# Patient Record
Sex: FEMALE | Employment: UNEMPLOYED | URBAN - METROPOLITAN AREA
[De-identification: names, ages, dates, MRNs, and addresses within clinical notes are randomized per-mention and may not be internally consistent; named-entity substitution may affect disease eponyms.]

---

## 2018-01-01 ENCOUNTER — APPOINTMENT (OUTPATIENT)
Dept: GENERAL RADIOLOGY | Age: 83
DRG: 871 | End: 2018-01-01
Attending: EMERGENCY MEDICINE
Payer: COMMERCIAL

## 2018-01-01 ENCOUNTER — APPOINTMENT (OUTPATIENT)
Dept: CT IMAGING | Age: 83
DRG: 871 | End: 2018-01-01
Attending: PHYSICIAN ASSISTANT
Payer: COMMERCIAL

## 2018-01-01 ENCOUNTER — HOSPITAL ENCOUNTER (INPATIENT)
Age: 83
LOS: 4 days | Discharge: NURSING, REHAB OR LONG-TERM CARE | DRG: 871 | End: 2018-06-08
Attending: EMERGENCY MEDICINE | Admitting: INTERNAL MEDICINE
Payer: COMMERCIAL

## 2018-01-01 VITALS
OXYGEN SATURATION: 100 % | TEMPERATURE: 97.2 F | HEART RATE: 73 BPM | WEIGHT: 94.3 LBS | RESPIRATION RATE: 18 BRPM | SYSTOLIC BLOOD PRESSURE: 81 MMHG | DIASTOLIC BLOOD PRESSURE: 51 MMHG | BODY MASS INDEX: 17.35 KG/M2 | HEIGHT: 62 IN

## 2018-01-01 DIAGNOSIS — Z51.5 HOSPICE CARE: ICD-10-CM

## 2018-01-01 DIAGNOSIS — N28.9 RENAL INSUFFICIENCY: ICD-10-CM

## 2018-01-01 DIAGNOSIS — E86.1 HYPOVOLEMIA: Primary | ICD-10-CM

## 2018-01-01 DIAGNOSIS — I48.91 ATRIAL FIBRILLATION WITH RVR (HCC): ICD-10-CM

## 2018-01-01 LAB
ALBUMIN SERPL-MCNC: 2.5 G/DL (ref 3.4–5)
ALBUMIN SERPL-MCNC: 3.5 G/DL (ref 3.4–5)
ALBUMIN/GLOB SERPL: 0.6 {RATIO} (ref 0.8–1.7)
ALBUMIN/GLOB SERPL: 0.7 {RATIO} (ref 0.8–1.7)
ALP SERPL-CCNC: 77 U/L (ref 45–117)
ALP SERPL-CCNC: 93 U/L (ref 45–117)
ALT SERPL-CCNC: 16 U/L (ref 13–56)
ALT SERPL-CCNC: 54 U/L (ref 13–56)
ANION GAP SERPL CALC-SCNC: 14 MMOL/L (ref 3–18)
ANION GAP SERPL CALC-SCNC: 15 MMOL/L (ref 3–18)
ANION GAP SERPL CALC-SCNC: 16 MMOL/L (ref 3–18)
ANION GAP SERPL CALC-SCNC: 18 MMOL/L (ref 3–18)
ANION GAP SERPL CALC-SCNC: 22 MMOL/L (ref 3–18)
APPEARANCE UR: ABNORMAL
APTT PPP: 30.2 SEC (ref 23–36.4)
ARTERIAL PATENCY WRIST A: YES
AST SERPL-CCNC: 30 U/L (ref 15–37)
AST SERPL-CCNC: 84 U/L (ref 15–37)
ATRIAL RATE: 141 BPM
ATRIAL RATE: 150 BPM
BACTERIA SPEC CULT: ABNORMAL
BACTERIA SPEC CULT: NORMAL
BACTERIA URNS QL MICRO: ABNORMAL /HPF
BASE DEFICIT BLD-SCNC: 8 MMOL/L
BASOPHILS # BLD: 0 K/UL (ref 0–0.06)
BASOPHILS NFR BLD: 0 % (ref 0–3)
BDY SITE: ABNORMAL
BILIRUB SERPL-MCNC: 0.5 MG/DL (ref 0.2–1)
BILIRUB SERPL-MCNC: 1.6 MG/DL (ref 0.2–1)
BILIRUB UR QL: NEGATIVE
BUN SERPL-MCNC: 82 MG/DL (ref 7–18)
BUN SERPL-MCNC: 83 MG/DL (ref 7–18)
BUN SERPL-MCNC: 87 MG/DL (ref 7–18)
BUN SERPL-MCNC: 90 MG/DL (ref 7–18)
BUN SERPL-MCNC: 93 MG/DL (ref 7–18)
BUN/CREAT SERPL: 33 (ref 12–20)
BUN/CREAT SERPL: 44 (ref 12–20)
BUN/CREAT SERPL: 46 (ref 12–20)
BUN/CREAT SERPL: 46 (ref 12–20)
BUN/CREAT SERPL: 48 (ref 12–20)
CALCIUM SERPL-MCNC: 11.2 MG/DL (ref 8.5–10.1)
CALCIUM SERPL-MCNC: 8.6 MG/DL (ref 8.5–10.1)
CALCIUM SERPL-MCNC: 8.8 MG/DL (ref 8.5–10.1)
CALCIUM SERPL-MCNC: 9 MG/DL (ref 8.5–10.1)
CALCIUM SERPL-MCNC: 9.2 MG/DL (ref 8.5–10.1)
CALCULATED R AXIS, ECG10: 27 DEGREES
CALCULATED R AXIS, ECG10: 7 DEGREES
CALCULATED T AXIS, ECG11: -78 DEGREES
CALCULATED T AXIS, ECG11: -95 DEGREES
CHLORIDE SERPL-SCNC: 108 MMOL/L (ref 100–108)
CHLORIDE SERPL-SCNC: 113 MMOL/L (ref 100–108)
CHLORIDE SERPL-SCNC: 114 MMOL/L (ref 100–108)
CHLORIDE SERPL-SCNC: 115 MMOL/L (ref 100–108)
CHLORIDE SERPL-SCNC: 116 MMOL/L (ref 100–108)
CK MB CFR SERPL CALC: 3.4 % (ref 0–4)
CK MB CFR SERPL CALC: 3.7 % (ref 0–4)
CK MB SERPL-MCNC: 25.3 NG/ML (ref 5–25)
CK MB SERPL-MCNC: 27.4 NG/ML (ref 5–25)
CK SERPL-CCNC: 692 U/L (ref 26–192)
CK SERPL-CCNC: 702 U/L (ref 26–192)
CK SERPL-CCNC: 809 U/L (ref 26–192)
CO2 SERPL-SCNC: 12 MMOL/L (ref 21–32)
CO2 SERPL-SCNC: 14 MMOL/L (ref 21–32)
CO2 SERPL-SCNC: 16 MMOL/L (ref 21–32)
CO2 SERPL-SCNC: 18 MMOL/L (ref 21–32)
CO2 SERPL-SCNC: 18 MMOL/L (ref 21–32)
COLOR UR: ABNORMAL
CREAT SERPL-MCNC: 1.81 MG/DL (ref 0.6–1.3)
CREAT SERPL-MCNC: 1.9 MG/DL (ref 0.6–1.3)
CREAT SERPL-MCNC: 1.94 MG/DL (ref 0.6–1.3)
CREAT SERPL-MCNC: 2.02 MG/DL (ref 0.6–1.3)
CREAT SERPL-MCNC: 2.47 MG/DL (ref 0.6–1.3)
DIAGNOSIS, 93000: NORMAL
DIAGNOSIS, 93000: NORMAL
DIFFERENTIAL METHOD BLD: ABNORMAL
EOSINOPHIL # BLD: 0 K/UL (ref 0–0.4)
EOSINOPHIL NFR BLD: 0 % (ref 0–5)
ERYTHROCYTE [DISTWIDTH] IN BLOOD BY AUTOMATED COUNT: 15.4 % (ref 11.6–14.5)
ERYTHROCYTE [DISTWIDTH] IN BLOOD BY AUTOMATED COUNT: 15.4 % (ref 11.6–14.5)
ERYTHROCYTE [DISTWIDTH] IN BLOOD BY AUTOMATED COUNT: 15.9 % (ref 11.6–14.5)
GAS FLOW.O2 O2 DELIVERY SYS: ABNORMAL L/MIN
GAS FLOW.O2 SETTING OXYMISER: 3 L/M
GLOBULIN SER CALC-MCNC: 4 G/DL (ref 2–4)
GLOBULIN SER CALC-MCNC: 5 G/DL (ref 2–4)
GLUCOSE BLD STRIP.AUTO-MCNC: 104 MG/DL (ref 70–110)
GLUCOSE BLD STRIP.AUTO-MCNC: 109 MG/DL (ref 70–110)
GLUCOSE BLD STRIP.AUTO-MCNC: 109 MG/DL (ref 70–110)
GLUCOSE BLD STRIP.AUTO-MCNC: 111 MG/DL (ref 70–110)
GLUCOSE BLD STRIP.AUTO-MCNC: 68 MG/DL (ref 70–110)
GLUCOSE BLD STRIP.AUTO-MCNC: 68 MG/DL (ref 70–110)
GLUCOSE BLD STRIP.AUTO-MCNC: 77 MG/DL (ref 70–110)
GLUCOSE BLD STRIP.AUTO-MCNC: 80 MG/DL (ref 70–110)
GLUCOSE BLD STRIP.AUTO-MCNC: 82 MG/DL (ref 70–110)
GLUCOSE BLD STRIP.AUTO-MCNC: 88 MG/DL (ref 70–110)
GLUCOSE SERPL-MCNC: 102 MG/DL (ref 74–99)
GLUCOSE SERPL-MCNC: 105 MG/DL (ref 74–99)
GLUCOSE SERPL-MCNC: 112 MG/DL (ref 74–99)
GLUCOSE SERPL-MCNC: 138 MG/DL (ref 74–99)
GLUCOSE SERPL-MCNC: 142 MG/DL (ref 74–99)
GLUCOSE UR STRIP.AUTO-MCNC: NEGATIVE MG/DL
HCO3 BLD-SCNC: 14.8 MMOL/L (ref 22–26)
HCT VFR BLD AUTO: 37 % (ref 35–45)
HCT VFR BLD AUTO: 37.1 % (ref 35–45)
HCT VFR BLD AUTO: 40.4 % (ref 35–45)
HGB BLD-MCNC: 12.2 G/DL (ref 12–16)
HGB BLD-MCNC: 12.5 G/DL (ref 12–16)
HGB BLD-MCNC: 13.2 G/DL (ref 12–16)
HGB UR QL STRIP: ABNORMAL
INR PPP: 1.3 (ref 0.8–1.2)
KETONES UR QL STRIP.AUTO: 15 MG/DL
LACTATE BLD-SCNC: 10.9 MMOL/L (ref 0.4–2)
LACTATE BLD-SCNC: 2.1 MMOL/L (ref 0.4–2)
LACTATE BLD-SCNC: 2.6 MMOL/L (ref 0.4–2)
LACTATE BLD-SCNC: 2.7 MMOL/L (ref 0.4–2)
LACTATE SERPL-SCNC: 1.8 MMOL/L (ref 0.4–2)
LEUKOCYTE ESTERASE UR QL STRIP.AUTO: ABNORMAL
LYMPHOCYTES # BLD: 0.3 K/UL (ref 0.8–3.5)
LYMPHOCYTES # BLD: 0.8 K/UL (ref 0.8–3.5)
LYMPHOCYTES # BLD: 2.3 K/UL (ref 0.8–3.5)
LYMPHOCYTES NFR BLD: 2 % (ref 20–51)
LYMPHOCYTES NFR BLD: 26 % (ref 20–51)
LYMPHOCYTES NFR BLD: 5 % (ref 20–51)
MAGNESIUM SERPL-MCNC: 2.1 MG/DL (ref 1.6–2.6)
MCH RBC QN AUTO: 29.2 PG (ref 24–34)
MCH RBC QN AUTO: 29.5 PG (ref 24–34)
MCH RBC QN AUTO: 29.8 PG (ref 24–34)
MCHC RBC AUTO-ENTMCNC: 32.7 G/DL (ref 31–37)
MCHC RBC AUTO-ENTMCNC: 32.9 G/DL (ref 31–37)
MCHC RBC AUTO-ENTMCNC: 33.8 G/DL (ref 31–37)
MCV RBC AUTO: 88.3 FL (ref 74–97)
MCV RBC AUTO: 88.8 FL (ref 74–97)
MCV RBC AUTO: 90.2 FL (ref 74–97)
METAMYELOCYTES NFR BLD MANUAL: 1 %
MONOCYTES # BLD: 0.2 K/UL (ref 0–1)
MONOCYTES # BLD: 0.7 K/UL (ref 0–1)
MONOCYTES # BLD: 0.7 K/UL (ref 0–1)
MONOCYTES NFR BLD: 2 % (ref 2–9)
MONOCYTES NFR BLD: 4 % (ref 2–9)
MONOCYTES NFR BLD: 4 % (ref 2–9)
NEUTS BAND NFR BLD MANUAL: 15 % (ref 0–5)
NEUTS BAND NFR BLD MANUAL: 28 % (ref 0–5)
NEUTS SEG # BLD: 15.2 K/UL (ref 1.8–8)
NEUTS SEG # BLD: 15.4 K/UL (ref 1.8–8)
NEUTS SEG # BLD: 6.3 K/UL (ref 1.8–8)
NEUTS SEG NFR BLD: 65 % (ref 42–75)
NEUTS SEG NFR BLD: 72 % (ref 42–75)
NEUTS SEG NFR BLD: 76 % (ref 42–75)
NITRITE UR QL STRIP.AUTO: NEGATIVE
PCO2 BLD: 24.2 MMHG (ref 35–45)
PH BLD: 7.4 [PH] (ref 7.35–7.45)
PH UR STRIP: 5.5 [PH] (ref 5–8)
PHOSPHATE SERPL-MCNC: 5.2 MG/DL (ref 2.5–4.9)
PLATELET # BLD AUTO: 212 K/UL (ref 135–420)
PLATELET # BLD AUTO: 226 K/UL (ref 135–420)
PLATELET # BLD AUTO: 255 K/UL (ref 135–420)
PLATELET COMMENTS,PCOM: ABNORMAL
PMV BLD AUTO: 10.6 FL (ref 9.2–11.8)
PMV BLD AUTO: 10.8 FL (ref 9.2–11.8)
PMV BLD AUTO: 11 FL (ref 9.2–11.8)
PO2 BLD: 150 MMHG (ref 80–100)
POTASSIUM SERPL-SCNC: 4.2 MMOL/L (ref 3.5–5.5)
POTASSIUM SERPL-SCNC: 4.2 MMOL/L (ref 3.5–5.5)
POTASSIUM SERPL-SCNC: 4.3 MMOL/L (ref 3.5–5.5)
POTASSIUM SERPL-SCNC: 4.5 MMOL/L (ref 3.5–5.5)
POTASSIUM SERPL-SCNC: 4.8 MMOL/L (ref 3.5–5.5)
PROT SERPL-MCNC: 6.5 G/DL (ref 6.4–8.2)
PROT SERPL-MCNC: 8.5 G/DL (ref 6.4–8.2)
PROT UR STRIP-MCNC: 100 MG/DL
PROTHROMBIN TIME: 16 SEC (ref 11.5–15.2)
Q-T INTERVAL, ECG07: 300 MS
Q-T INTERVAL, ECG07: 356 MS
QRS DURATION, ECG06: 70 MS
QRS DURATION, ECG06: 76 MS
QTC CALCULATION (BEZET), ECG08: 474 MS
QTC CALCULATION (BEZET), ECG08: 545 MS
RBC # BLD AUTO: 4.18 M/UL (ref 4.2–5.3)
RBC # BLD AUTO: 4.19 M/UL (ref 4.2–5.3)
RBC # BLD AUTO: 4.48 M/UL (ref 4.2–5.3)
RBC #/AREA URNS HPF: POSITIVE /HPF (ref 0–5)
RBC MORPH BLD: ABNORMAL
SAO2 % BLD: 99 % (ref 92–97)
SERVICE CMNT-IMP: ABNORMAL
SERVICE CMNT-IMP: ABNORMAL
SERVICE CMNT-IMP: NORMAL
SODIUM SERPL-SCNC: 142 MMOL/L (ref 136–145)
SODIUM SERPL-SCNC: 145 MMOL/L (ref 136–145)
SODIUM SERPL-SCNC: 147 MMOL/L (ref 136–145)
SODIUM SERPL-SCNC: 147 MMOL/L (ref 136–145)
SODIUM SERPL-SCNC: 148 MMOL/L (ref 136–145)
SP GR UR REFRACTOMETRY: 1.01 (ref 1–1.03)
SPECIMEN TYPE: ABNORMAL
TOTAL RESP. RATE, ITRR: 25
TROPONIN I SERPL-MCNC: 0.07 NG/ML (ref 0–0.04)
TROPONIN I SERPL-MCNC: 0.21 NG/ML (ref 0–0.04)
TROPONIN I SERPL-MCNC: 0.52 NG/ML (ref 0–0.04)
TROPONIN I SERPL-MCNC: 0.52 NG/ML (ref 0–0.04)
TSH SERPL DL<=0.05 MIU/L-ACNC: 0.53 UIU/ML (ref 0.36–3.74)
UROBILINOGEN UR QL STRIP.AUTO: 1 EU/DL (ref 0.2–1)
VENTRICULAR RATE, ECG03: 141 BPM
VENTRICULAR RATE, ECG03: 150 BPM
WBC # BLD AUTO: 16.3 K/UL (ref 4.6–13.2)
WBC # BLD AUTO: 16.9 K/UL (ref 4.6–13.2)
WBC # BLD AUTO: 8.8 K/UL (ref 4.6–13.2)
WBC URNS QL MICRO: ABNORMAL /HPF (ref 0–4)

## 2018-01-01 PROCEDURE — 65660000000 HC RM CCU STEPDOWN

## 2018-01-01 PROCEDURE — 92610 EVALUATE SWALLOWING FUNCTION: CPT

## 2018-01-01 PROCEDURE — 93005 ELECTROCARDIOGRAM TRACING: CPT

## 2018-01-01 PROCEDURE — 81001 URINALYSIS AUTO W/SCOPE: CPT | Performed by: EMERGENCY MEDICINE

## 2018-01-01 PROCEDURE — 84100 ASSAY OF PHOSPHORUS: CPT | Performed by: PHYSICIAN ASSISTANT

## 2018-01-01 PROCEDURE — 83605 ASSAY OF LACTIC ACID: CPT

## 2018-01-01 PROCEDURE — 83605 ASSAY OF LACTIC ACID: CPT | Performed by: PHYSICIAN ASSISTANT

## 2018-01-01 PROCEDURE — 92526 ORAL FUNCTION THERAPY: CPT

## 2018-01-01 PROCEDURE — 74011250636 HC RX REV CODE- 250/636: Performed by: HOSPITALIST

## 2018-01-01 PROCEDURE — 85730 THROMBOPLASTIN TIME PARTIAL: CPT | Performed by: PHYSICIAN ASSISTANT

## 2018-01-01 PROCEDURE — 96375 TX/PRO/DX INJ NEW DRUG ADDON: CPT

## 2018-01-01 PROCEDURE — 74011000250 HC RX REV CODE- 250: Performed by: HOSPITALIST

## 2018-01-01 PROCEDURE — 82962 GLUCOSE BLOOD TEST: CPT

## 2018-01-01 PROCEDURE — 85025 COMPLETE CBC W/AUTO DIFF WBC: CPT | Performed by: PHYSICIAN ASSISTANT

## 2018-01-01 PROCEDURE — 74011000250 HC RX REV CODE- 250: Performed by: PHYSICIAN ASSISTANT

## 2018-01-01 PROCEDURE — 82550 ASSAY OF CK (CPK): CPT | Performed by: PHYSICIAN ASSISTANT

## 2018-01-01 PROCEDURE — 74011000250 HC RX REV CODE- 250: Performed by: INTERNAL MEDICINE

## 2018-01-01 PROCEDURE — 83735 ASSAY OF MAGNESIUM: CPT | Performed by: PHYSICIAN ASSISTANT

## 2018-01-01 PROCEDURE — 74011000258 HC RX REV CODE- 258: Performed by: PHYSICIAN ASSISTANT

## 2018-01-01 PROCEDURE — 96361 HYDRATE IV INFUSION ADD-ON: CPT

## 2018-01-01 PROCEDURE — 80053 COMPREHEN METABOLIC PANEL: CPT | Performed by: HOSPITALIST

## 2018-01-01 PROCEDURE — 74011250636 HC RX REV CODE- 250/636: Performed by: EMERGENCY MEDICINE

## 2018-01-01 PROCEDURE — 74011250636 HC RX REV CODE- 250/636: Performed by: INTERNAL MEDICINE

## 2018-01-01 PROCEDURE — 74011250636 HC RX REV CODE- 250/636

## 2018-01-01 PROCEDURE — 82803 BLOOD GASES ANY COMBINATION: CPT

## 2018-01-01 PROCEDURE — 87186 SC STD MICRODIL/AGAR DIL: CPT | Performed by: PHYSICIAN ASSISTANT

## 2018-01-01 PROCEDURE — 80048 BASIC METABOLIC PNL TOTAL CA: CPT | Performed by: INTERNAL MEDICINE

## 2018-01-01 PROCEDURE — 36415 COLL VENOUS BLD VENIPUNCTURE: CPT | Performed by: HOSPITALIST

## 2018-01-01 PROCEDURE — 77010033678 HC OXYGEN DAILY

## 2018-01-01 PROCEDURE — 36600 WITHDRAWAL OF ARTERIAL BLOOD: CPT

## 2018-01-01 PROCEDURE — 94760 N-INVAS EAR/PLS OXIMETRY 1: CPT

## 2018-01-01 PROCEDURE — 82550 ASSAY OF CK (CPK): CPT | Performed by: HOSPITALIST

## 2018-01-01 PROCEDURE — 87040 BLOOD CULTURE FOR BACTERIA: CPT | Performed by: EMERGENCY MEDICINE

## 2018-01-01 PROCEDURE — 36415 COLL VENOUS BLD VENIPUNCTURE: CPT | Performed by: INTERNAL MEDICINE

## 2018-01-01 PROCEDURE — 74011000258 HC RX REV CODE- 258: Performed by: INTERNAL MEDICINE

## 2018-01-01 PROCEDURE — 77030005563 HC CATH URETH INT MMGH -A

## 2018-01-01 PROCEDURE — 96365 THER/PROPH/DIAG IV INF INIT: CPT

## 2018-01-01 PROCEDURE — 74011000258 HC RX REV CODE- 258: Performed by: EMERGENCY MEDICINE

## 2018-01-01 PROCEDURE — 87077 CULTURE AEROBIC IDENTIFY: CPT | Performed by: PHYSICIAN ASSISTANT

## 2018-01-01 PROCEDURE — 87086 URINE CULTURE/COLONY COUNT: CPT | Performed by: PHYSICIAN ASSISTANT

## 2018-01-01 PROCEDURE — 74011250636 HC RX REV CODE- 250/636: Performed by: PHYSICIAN ASSISTANT

## 2018-01-01 PROCEDURE — 80048 BASIC METABOLIC PNL TOTAL CA: CPT | Performed by: PHYSICIAN ASSISTANT

## 2018-01-01 PROCEDURE — 80053 COMPREHEN METABOLIC PANEL: CPT | Performed by: EMERGENCY MEDICINE

## 2018-01-01 PROCEDURE — 77030038269 HC DRN EXT URIN PURWCK BARD -A

## 2018-01-01 PROCEDURE — 74011000250 HC RX REV CODE- 250: Performed by: EMERGENCY MEDICINE

## 2018-01-01 PROCEDURE — 70450 CT HEAD/BRAIN W/O DYE: CPT

## 2018-01-01 PROCEDURE — 71045 X-RAY EXAM CHEST 1 VIEW: CPT

## 2018-01-01 PROCEDURE — 74011000258 HC RX REV CODE- 258: Performed by: HOSPITALIST

## 2018-01-01 PROCEDURE — 84443 ASSAY THYROID STIM HORMONE: CPT | Performed by: PHYSICIAN ASSISTANT

## 2018-01-01 PROCEDURE — 84484 ASSAY OF TROPONIN QUANT: CPT | Performed by: EMERGENCY MEDICINE

## 2018-01-01 PROCEDURE — 51701 INSERT BLADDER CATHETER: CPT

## 2018-01-01 PROCEDURE — 99285 EMERGENCY DEPT VISIT HI MDM: CPT

## 2018-01-01 PROCEDURE — 87493 C DIFF AMPLIFIED PROBE: CPT | Performed by: HOSPITALIST

## 2018-01-01 PROCEDURE — 85610 PROTHROMBIN TIME: CPT | Performed by: PHYSICIAN ASSISTANT

## 2018-01-01 PROCEDURE — 85025 COMPLETE CBC W/AUTO DIFF WBC: CPT | Performed by: EMERGENCY MEDICINE

## 2018-01-01 PROCEDURE — 74011250637 HC RX REV CODE- 250/637: Performed by: HOSPITALIST

## 2018-01-01 PROCEDURE — 77030037878 HC DRSG MEPILEX >48IN BORD MOLN -B

## 2018-01-01 PROCEDURE — 77030027138 HC INCENT SPIROMETER -A

## 2018-01-01 PROCEDURE — 65610000006 HC RM INTENSIVE CARE

## 2018-01-01 PROCEDURE — 85025 COMPLETE CBC W/AUTO DIFF WBC: CPT | Performed by: HOSPITALIST

## 2018-01-01 RX ORDER — DEXTROSE MONOHYDRATE AND SODIUM CHLORIDE 5; .9 G/100ML; G/100ML
75 INJECTION, SOLUTION INTRAVENOUS CONTINUOUS
Status: DISCONTINUED | OUTPATIENT
Start: 2018-01-01 | End: 2018-01-01

## 2018-01-01 RX ORDER — LORAZEPAM 2 MG/ML
0.5 CONCENTRATE ORAL
Qty: 5 ML | Refills: 0 | Status: SHIPPED | OUTPATIENT
Start: 2018-01-01

## 2018-01-01 RX ORDER — LEVOFLOXACIN 250 MG/1
250 TABLET ORAL EVERY 24 HOURS
Qty: 4 TAB | Refills: 0 | Status: SHIPPED | OUTPATIENT
Start: 2018-01-01 | End: 2018-01-01

## 2018-01-01 RX ORDER — METOPROLOL TARTRATE 5 MG/5ML
2.5 INJECTION INTRAVENOUS EVERY 6 HOURS
Status: DISCONTINUED | OUTPATIENT
Start: 2018-01-01 | End: 2018-01-01

## 2018-01-01 RX ORDER — HEPARIN SODIUM 5000 [USP'U]/ML
5000 INJECTION, SOLUTION INTRAVENOUS; SUBCUTANEOUS EVERY 8 HOURS
Status: DISCONTINUED | OUTPATIENT
Start: 2018-01-01 | End: 2018-01-01

## 2018-01-01 RX ORDER — MAGNESIUM SULFATE 100 %
4 CRYSTALS MISCELLANEOUS AS NEEDED
Status: DISCONTINUED | OUTPATIENT
Start: 2018-01-01 | End: 2018-01-01 | Stop reason: HOSPADM

## 2018-01-01 RX ORDER — POLYETHYLENE GLYCOL 3350 17 G/17G
17 POWDER, FOR SOLUTION ORAL
COMMUNITY
End: 2018-01-01

## 2018-01-01 RX ORDER — SODIUM CHLORIDE 9 MG/ML
50 INJECTION, SOLUTION INTRAVENOUS CONTINUOUS
Status: DISCONTINUED | OUTPATIENT
Start: 2018-01-01 | End: 2018-01-01

## 2018-01-01 RX ORDER — METOPROLOL TARTRATE 25 MG/1
12.5 TABLET, FILM COATED ORAL EVERY 12 HOURS
Status: DISCONTINUED | OUTPATIENT
Start: 2018-01-01 | End: 2018-01-01

## 2018-01-01 RX ORDER — FAMOTIDINE 20 MG/1
20 TABLET, FILM COATED ORAL DAILY
Status: DISCONTINUED | OUTPATIENT
Start: 2018-01-01 | End: 2018-01-01 | Stop reason: HOSPADM

## 2018-01-01 RX ORDER — POLYVINYL ALCOHOL 14 MG/ML
2 SOLUTION/ DROPS OPHTHALMIC AS NEEDED
Qty: 30 ML | Refills: 0 | Status: SHIPPED | OUTPATIENT
Start: 2018-01-01

## 2018-01-01 RX ORDER — MORPHINE SULFATE 100 MG/5ML
2 SOLUTION ORAL
Status: DISCONTINUED | OUTPATIENT
Start: 2018-01-01 | End: 2018-01-01 | Stop reason: HOSPADM

## 2018-01-01 RX ORDER — LANOLIN ALCOHOL/MO/W.PET/CERES
1000 CREAM (GRAM) TOPICAL DAILY
COMMUNITY
End: 2018-01-01

## 2018-01-01 RX ORDER — ASPIRIN 81 MG/1
81 TABLET ORAL DAILY
Status: ON HOLD | COMMUNITY
End: 2018-01-01

## 2018-01-01 RX ORDER — ACETAMINOPHEN 500 MG
1000 TABLET ORAL
COMMUNITY
End: 2018-01-01

## 2018-01-01 RX ORDER — GUAIFENESIN/DEXTROMETHORPHAN 100-10MG/5
5 SYRUP ORAL
COMMUNITY
End: 2018-01-01

## 2018-01-01 RX ORDER — MORPHINE SULFATE 100 MG/5ML
2 SOLUTION ORAL
Qty: 5 ML | Refills: 0 | Status: SHIPPED | OUTPATIENT
Start: 2018-01-01

## 2018-01-01 RX ORDER — OMEPRAZOLE 10 MG/1
10 CAPSULE, DELAYED RELEASE ORAL DAILY
COMMUNITY
End: 2018-01-01

## 2018-01-01 RX ORDER — LORAZEPAM 2 MG/ML
0.5 INJECTION INTRAMUSCULAR
Status: DISCONTINUED | OUTPATIENT
Start: 2018-01-01 | End: 2018-01-01

## 2018-01-01 RX ORDER — LORAZEPAM 2 MG/ML
0.5 CONCENTRATE ORAL
Status: DISCONTINUED | OUTPATIENT
Start: 2018-01-01 | End: 2018-01-01 | Stop reason: HOSPADM

## 2018-01-01 RX ORDER — BUSPIRONE HYDROCHLORIDE 10 MG/1
10 TABLET ORAL 2 TIMES DAILY
COMMUNITY
End: 2018-01-01

## 2018-01-01 RX ORDER — MEGESTROL ACETATE 40 MG/ML
400 SUSPENSION ORAL DAILY
COMMUNITY
End: 2018-01-01

## 2018-01-01 RX ORDER — ASPIRIN 81 MG/1
81 TABLET ORAL DAILY
Qty: 30 TAB | Refills: 0 | Status: SHIPPED | OUTPATIENT
Start: 2018-01-01

## 2018-01-01 RX ORDER — LISINOPRIL 5 MG/1
5 TABLET ORAL DAILY
COMMUNITY
End: 2018-01-01

## 2018-01-01 RX ORDER — METOPROLOL TARTRATE 5 MG/5ML
2.5 INJECTION INTRAVENOUS ONCE
Status: COMPLETED | OUTPATIENT
Start: 2018-01-01 | End: 2018-01-01

## 2018-01-01 RX ORDER — DEXTROSE MONOHYDRATE AND SODIUM CHLORIDE 5; .45 G/100ML; G/100ML
100 INJECTION, SOLUTION INTRAVENOUS CONTINUOUS
Status: DISCONTINUED | OUTPATIENT
Start: 2018-01-01 | End: 2018-01-01

## 2018-01-01 RX ORDER — LORATADINE 10 MG/1
10 TABLET ORAL DAILY
COMMUNITY
End: 2018-01-01

## 2018-01-01 RX ORDER — DEXTROSE 50 % IN WATER (D50W) INTRAVENOUS SYRINGE
25-50 AS NEEDED
Status: DISCONTINUED | OUTPATIENT
Start: 2018-01-01 | End: 2018-01-01 | Stop reason: HOSPADM

## 2018-01-01 RX ORDER — POLYVINYL ALCOHOL 14 MG/ML
2 SOLUTION/ DROPS OPHTHALMIC AS NEEDED
Status: ON HOLD | COMMUNITY
End: 2018-01-01

## 2018-01-01 RX ORDER — ANASTROZOLE 1 MG/1
1 TABLET ORAL DAILY
COMMUNITY
End: 2018-01-01

## 2018-01-01 RX ORDER — CHOLECALCIFEROL (VITAMIN D3) 125 MCG
1 CAPSULE ORAL DAILY
COMMUNITY
End: 2018-01-01

## 2018-01-01 RX ORDER — HEPARIN SODIUM 5000 [USP'U]/ML
INJECTION, SOLUTION INTRAVENOUS; SUBCUTANEOUS
Status: COMPLETED
Start: 2018-01-01 | End: 2018-01-01

## 2018-01-01 RX ORDER — UREA 10 %
2 LOTION (ML) TOPICAL 2 TIMES DAILY
Status: DISCONTINUED | OUTPATIENT
Start: 2018-01-01 | End: 2018-01-01 | Stop reason: HOSPADM

## 2018-01-01 RX ORDER — DILTIAZEM HYDROCHLORIDE 5 MG/ML
5 INJECTION INTRAVENOUS
Status: COMPLETED | OUTPATIENT
Start: 2018-01-01 | End: 2018-01-01

## 2018-01-01 RX ORDER — ONDANSETRON 2 MG/ML
4 INJECTION INTRAMUSCULAR; INTRAVENOUS
Status: COMPLETED | OUTPATIENT
Start: 2018-01-01 | End: 2018-01-01

## 2018-01-01 RX ORDER — LEVOFLOXACIN 250 MG/1
250 TABLET ORAL EVERY 24 HOURS
Status: DISCONTINUED | OUTPATIENT
Start: 2018-01-01 | End: 2018-01-01 | Stop reason: HOSPADM

## 2018-01-01 RX ORDER — SODIUM CHLORIDE 450 MG/100ML
75 INJECTION, SOLUTION INTRAVENOUS CONTINUOUS
Status: DISCONTINUED | OUTPATIENT
Start: 2018-01-01 | End: 2018-01-01

## 2018-01-01 RX ORDER — ACETAMINOPHEN 500 MG
500 TABLET ORAL
Status: DISCONTINUED | OUTPATIENT
Start: 2018-01-01 | End: 2018-01-01 | Stop reason: HOSPADM

## 2018-01-01 RX ORDER — DOCUSATE SODIUM 100 MG/1
100 CAPSULE, LIQUID FILLED ORAL
COMMUNITY
End: 2018-01-01

## 2018-01-01 RX ORDER — FLUOCINONIDE TOPICAL SOLUTION USP, 0.05% 0.5 MG/ML
1 SOLUTION TOPICAL
COMMUNITY
End: 2018-01-01

## 2018-01-01 RX ORDER — METOPROLOL TARTRATE 5 MG/5ML
5 INJECTION INTRAVENOUS EVERY 6 HOURS
Status: DISCONTINUED | OUTPATIENT
Start: 2018-01-01 | End: 2018-01-01

## 2018-01-01 RX ADMIN — HEPARIN SODIUM 5000 UNITS: 5000 INJECTION, SOLUTION INTRAVENOUS; SUBCUTANEOUS at 04:25

## 2018-01-01 RX ADMIN — SODIUM CHLORIDE 20 MG: 9 INJECTION INTRAMUSCULAR; INTRAVENOUS; SUBCUTANEOUS at 22:19

## 2018-01-01 RX ADMIN — DEXTROSE MONOHYDRATE AND SODIUM CHLORIDE 75 ML/HR: 5; .45 INJECTION, SOLUTION INTRAVENOUS at 06:35

## 2018-01-01 RX ADMIN — WATER 2 G: 1 INJECTION INTRAMUSCULAR; INTRAVENOUS; SUBCUTANEOUS at 00:27

## 2018-01-01 RX ADMIN — FAMOTIDINE 20 MG: 10 INJECTION, SOLUTION INTRAVENOUS at 13:18

## 2018-01-01 RX ADMIN — DEXTROSE MONOHYDRATE AND SODIUM CHLORIDE 75 ML/HR: 5; .45 INJECTION, SOLUTION INTRAVENOUS at 07:39

## 2018-01-01 RX ADMIN — DEXTROSE MONOHYDRATE 25 G: 25 INJECTION, SOLUTION INTRAVENOUS at 05:49

## 2018-01-01 RX ADMIN — METOPROLOL TARTRATE 2.5 MG: 5 INJECTION, SOLUTION INTRAVENOUS at 05:19

## 2018-01-01 RX ADMIN — DEXTROSE MONOHYDRATE 12.5 G: 25 INJECTION, SOLUTION INTRAVENOUS at 00:15

## 2018-01-01 RX ADMIN — SODIUM CHLORIDE 10 MG/HR: 900 INJECTION, SOLUTION INTRAVENOUS at 04:25

## 2018-01-01 RX ADMIN — LEVOFLOXACIN 250 MG: 250 TABLET, FILM COATED ORAL at 17:06

## 2018-01-01 RX ADMIN — DEXTROSE MONOHYDRATE AND SODIUM CHLORIDE 100 ML/HR: 5; .45 INJECTION, SOLUTION INTRAVENOUS at 19:53

## 2018-01-01 RX ADMIN — SODIUM CHLORIDE 1000 ML: 900 INJECTION, SOLUTION INTRAVENOUS at 12:58

## 2018-01-01 RX ADMIN — HEPARIN SODIUM 5000 UNITS: 5000 INJECTION, SOLUTION INTRAVENOUS; SUBCUTANEOUS at 20:30

## 2018-01-01 RX ADMIN — SODIUM CHLORIDE 20 MG: 9 INJECTION INTRAMUSCULAR; INTRAVENOUS; SUBCUTANEOUS at 08:58

## 2018-01-01 RX ADMIN — HEPARIN SODIUM 5000 UNITS: 5000 INJECTION, SOLUTION INTRAVENOUS; SUBCUTANEOUS at 22:19

## 2018-01-01 RX ADMIN — METOPROLOL TARTRATE 2.5 MG: 5 INJECTION, SOLUTION INTRAVENOUS at 00:48

## 2018-01-01 RX ADMIN — SODIUM CHLORIDE 50 ML/HR: 900 INJECTION, SOLUTION INTRAVENOUS at 03:57

## 2018-01-01 RX ADMIN — METOPROLOL TARTRATE 2.5 MG: 5 INJECTION, SOLUTION INTRAVENOUS at 00:04

## 2018-01-01 RX ADMIN — DILTIAZEM HYDROCHLORIDE 5 MG: 5 INJECTION INTRAVENOUS at 16:58

## 2018-01-01 RX ADMIN — WATER 2 G: 1 INJECTION INTRAMUSCULAR; INTRAVENOUS; SUBCUTANEOUS at 00:38

## 2018-01-01 RX ADMIN — HEPARIN SODIUM 5000 UNITS: 5000 INJECTION, SOLUTION INTRAVENOUS; SUBCUTANEOUS at 13:18

## 2018-01-01 RX ADMIN — SODIUM CHLORIDE 5 MG/HR: 900 INJECTION, SOLUTION INTRAVENOUS at 17:59

## 2018-01-01 RX ADMIN — WATER 2 G: 1 INJECTION INTRAMUSCULAR; INTRAVENOUS; SUBCUTANEOUS at 00:04

## 2018-01-01 RX ADMIN — METOPROLOL TARTRATE 5 MG: 5 INJECTION, SOLUTION INTRAVENOUS at 13:18

## 2018-01-01 RX ADMIN — LORAZEPAM 0.5 MG: 2 INJECTION INTRAMUSCULAR; INTRAVENOUS at 00:53

## 2018-01-01 RX ADMIN — HEPARIN SODIUM 5000 UNITS: 5000 INJECTION, SOLUTION INTRAVENOUS; SUBCUTANEOUS at 06:15

## 2018-01-01 RX ADMIN — SODIUM CHLORIDE 75 ML/HR: 450 INJECTION, SOLUTION INTRAVENOUS at 09:30

## 2018-01-01 RX ADMIN — LORAZEPAM 0.5 MG: 2 INJECTION INTRAMUSCULAR; INTRAVENOUS at 16:59

## 2018-01-01 RX ADMIN — HEPARIN SODIUM 5000 UNITS: 5000 INJECTION, SOLUTION INTRAVENOUS; SUBCUTANEOUS at 14:36

## 2018-01-01 RX ADMIN — METOPROLOL TARTRATE 2.5 MG: 5 INJECTION, SOLUTION INTRAVENOUS at 06:15

## 2018-01-01 RX ADMIN — METOPROLOL TARTRATE 2.5 MG: 5 INJECTION, SOLUTION INTRAVENOUS at 22:37

## 2018-01-01 RX ADMIN — HEPARIN SODIUM 5000 UNITS: 5000 INJECTION, SOLUTION INTRAVENOUS; SUBCUTANEOUS at 05:19

## 2018-01-01 RX ADMIN — MORPHINE SULFATE 2 MG: 100 SOLUTION ORAL at 02:28

## 2018-01-01 RX ADMIN — METOPROLOL TARTRATE 2.5 MG: 5 INJECTION, SOLUTION INTRAVENOUS at 17:48

## 2018-01-01 RX ADMIN — FAMOTIDINE 20 MG: 20 TABLET ORAL at 17:06

## 2018-01-01 RX ADMIN — ONDANSETRON 4 MG: 2 INJECTION INTRAMUSCULAR; INTRAVENOUS at 16:55

## 2018-06-04 PROBLEM — G93.41 ACUTE METABOLIC ENCEPHALOPATHY: Status: ACTIVE | Noted: 2018-01-01

## 2018-06-04 PROBLEM — I48.91 ATRIAL FIBRILLATION WITH RVR (HCC): Status: ACTIVE | Noted: 2018-01-01

## 2018-06-04 NOTE — ED NOTES
Patient requesting the NRB be taken off since it's making her sick to her stomach. Patient placed on 02NC and tolerating well. She continues to try and remove the 02 from her nose.

## 2018-06-04 NOTE — Clinical Note
Status[de-identified] Inpatient [101] Type of Bed: Intensive Care [6] Inpatient Hospitalization Certified Necessary for the Following Reasons: 3. Patient receiving treatment that can only be provided in an inpatient setting (further clarification in H&P documentation) Admitting Diagnosis: Atrial [facility] with [facility] ([facility] [[id number]] Admitting Physician: Anjel Buchanan [[id number]] Attending Physician: Anjel Buchanan [[id number]] Estimated Length of Stay: 2 Midnights Discharge Plan[de-identified] Extended Care Facility (e.g. Adult Home, Nursing Home, etc.)

## 2018-06-04 NOTE — ED PROVIDER NOTES
EMERGENCY DEPARTMENT HISTORY AND PHYSICAL EXAM    12:47 PM      Date: 6/4/2018  Patient Name: Leatha Lr    History of Presenting Illness     Chief Complaint   Patient presents with    Altered mental status    Hypotension         History Provided By: EMS    Chief Complaint: Altered Mental Status   Duration: Earlier today  Timing:  Constant and Worsening  Location: N/A  Quality: N/A  Severity: Moderate  Modifying Factors: None   Associated Symptoms: Unable to assess      Additional History (Context): Leatha Lr is a 80 y.o. female with hx of a-fib and CKD presenting to the ED via EMS from [facility] with c/o constant, worsening altered mental status. Pt was at her PCP office when they found her slumpt over in her chair, appeared dry and non verbal. At baseline pt is active and converses. HPI limited due to pt's mental status change and is non verbal. Pt has a non-invasive DNR on file. No other sx or complaints given at this time. PCP: None        Past History     Past Medical History:  No past medical history on file. Past Surgical History:  No past surgical history on file. Family History:  No family history on file. Social History:  Social History   Substance Use Topics    Smoking status: Not on file    Smokeless tobacco: Not on file    Alcohol use Not on file       Allergies:  No Known Allergies      Review of Systems       Review of Systems   Unable to perform ROS: Mental status change         Physical Exam     Visit Vitals    /67    Pulse (!) 141    Temp 97.8 °F (36.6 °C)    Resp 25    Wt 51.3 kg (113 lb)    SpO2 99%         Physical Exam   Constitutional: She appears well-developed and well-nourished. HENT:   Head: Normocephalic and atraumatic. Mouth/Throat: Oropharynx is clear and moist. Mucous membranes are dry. Eyes: Conjunctivae are normal.   Neck: Normal range of motion. Neck supple. No JVD present.    Cardiovascular: Normal heart sounds and intact distal pulses. An irregularly irregular rhythm present. Tachycardia present. No murmur heard. Faintly palpable peripheral pulses   Pulmonary/Chest: Effort normal and breath sounds normal.   Abdominal: Soft. Bowel sounds are normal. She exhibits no distension. There is no tenderness. Musculoskeletal: Normal range of motion. She exhibits no deformity. Lymphadenopathy:     She has no cervical adenopathy. Neurological: Coordination normal.   Confused, opens eyes, however makes incomprehensible sounds   Skin: Skin is warm and dry. No rash noted. Psychiatric: She has a normal mood and affect. Nursing note and vitals reviewed. Diagnostic Study Results     Labs -  Recent Results (from the past 12 hour(s))   CBC WITH AUTOMATED DIFF    Collection Time: 06/04/18 12:45 PM   Result Value Ref Range    WBC 8.8 4.6 - 13.2 K/uL    RBC 4.48 4.20 - 5.30 M/uL    HGB 13.2 12.0 - 16.0 g/dL    HCT 40.4 35.0 - 45.0 %    MCV 90.2 74.0 - 97.0 FL    MCH 29.5 24.0 - 34.0 PG    MCHC 32.7 31.0 - 37.0 g/dL    RDW 15.4 (H) 11.6 - 14.5 %    PLATELET 314 573 - 680 K/uL    MPV 11.0 9.2 - 11.8 FL    NEUTROPHILS 72 42 - 75 %    LYMPHOCYTES 26 20 - 51 %    MONOCYTES 2 2 - 9 %    EOSINOPHILS 0 0 - 5 %    BASOPHILS 0 0 - 3 %    ABS. NEUTROPHILS 6.3 1.8 - 8.0 K/UL    ABS. LYMPHOCYTES 2.3 0.8 - 3.5 K/UL    ABS. MONOCYTES 0.2 0 - 1.0 K/UL    ABS. EOSINOPHILS 0.0 0.0 - 0.4 K/UL    ABS.  BASOPHILS 0.0 0.0 - 0.06 K/UL    DF MANUAL      PLATELET COMMENTS ADEQUATE PLATELETS      RBC COMMENTS ANISOCYTOSIS  1+        RBC COMMENTS OVALOCYTES  FEW       METABOLIC PANEL, COMPREHENSIVE    Collection Time: 06/04/18 12:45 PM   Result Value Ref Range    Sodium 142 136 - 145 mmol/L    Potassium 4.8 3.5 - 5.5 mmol/L    Chloride 108 100 - 108 mmol/L    CO2 12 (L) 21 - 32 mmol/L    Anion gap 22 (H) 3.0 - 18 mmol/L    Glucose 102 (H) 74 - 99 mg/dL    BUN 82 (H) 7.0 - 18 MG/DL    Creatinine 2.47 (H) 0.6 - 1.3 MG/DL    BUN/Creatinine ratio 33 (H) 12 - 20 GFR est AA 22 (L) >60 ml/min/1.73m2    GFR est non-AA 18 (L) >60 ml/min/1.73m2    Calcium 11.2 (H) 8.5 - 10.1 MG/DL    Bilirubin, total 1.6 (H) 0.2 - 1.0 MG/DL    ALT (SGPT) 16 13 - 56 U/L    AST (SGOT) 30 15 - 37 U/L    Alk. phosphatase 93 45 - 117 U/L    Protein, total 8.5 (H) 6.4 - 8.2 g/dL    Albumin 3.5 3.4 - 5.0 g/dL    Globulin 5.0 (H) 2.0 - 4.0 g/dL    A-G Ratio 0.7 (L) 0.8 - 1.7     TROPONIN I    Collection Time: 06/04/18 12:45 PM   Result Value Ref Range    Troponin-I, Qt. 0.07 (H) 0.0 - 0.045 NG/ML   POC LACTIC ACID    Collection Time: 06/04/18 12:50 PM   Result Value Ref Range    Lactic Acid (POC) 10.9 (HH) 0.4 - 2.0 mmol/L   POC LACTIC ACID    Collection Time: 06/04/18  7:27 PM   Result Value Ref Range    Lactic Acid (POC) 2.7 (HH) 0.4 - 2.0 mmol/L   URINALYSIS W/ RFLX MICROSCOPIC    Collection Time: 06/04/18  9:40 PM   Result Value Ref Range    Color DARK YELLOW      Appearance TURBID      Specific gravity 1.014 1.005 - 1.030      pH (UA) 5.5 5.0 - 8.0      Protein 100 (A) NEG mg/dL    Glucose NEGATIVE  NEG mg/dL    Ketone 15 (A) NEG mg/dL    Bilirubin NEGATIVE  NEG      Blood LARGE (A) NEG      Urobilinogen 1.0 0.2 - 1.0 EU/dL    Nitrites NEGATIVE  NEG      Leukocyte Esterase LARGE (A) NEG     URINE MICROSCOPIC ONLY    Collection Time: 06/04/18  9:40 PM   Result Value Ref Range    WBC TOO NUMEROUS TO COUNT 0 - 4 /hpf    RBC POSITIVE 0 - 5 /hpf    Bacteria 4+ (A) NEG /hpf   POC LACTIC ACID    Collection Time: 06/04/18 10:11 PM   Result Value Ref Range    Lactic Acid (POC) 2.6 (HH) 0.4 - 2.0 mmol/L       Radiologic Studies -   XR CHEST PORT   IMPRESSION:  No evidence of CHF. Minimal streaky fibroses or subtle atelectasis in right lung base. Subtle infiltrates are not excluded. But there is no evidence of confluent pneumonia or pleural effusion. Medical Decision Making   I am the first provider for this patient.     I reviewed the vital signs, available nursing notes, past medical history, past surgical history, family history and social history. Mary Carmona is a 80 y.o. female with hx of a-fib and CKD presenting to the ED via EMS from [facility] with c/o constant, worsening altered mental status. Pt was at her PCP office when they found her slumpt over in her chair, appeared dry and non verbal. At baseline pt is active and converses. HPI limited due to pt's mental status change and is non verbal. Pt has a non-invasive DNR on file. Patient in rapid afib, hypotensive. MOLST indicates limited interventions, so will start with IVF. Differential Diagnosis: rapid afib, hypovolemic, will evaluate for primary cardiac etiology, infection, metabolic derangement    Testing: cxr, cbc, cmp, troponin, ua  Treatments: IVF bolus x2L      Vital Signs-Reviewed the patient's vital signs. EKG: Interpreted by the EP. Time Interpreted: 8879   Rate: 150   Rhythm: Rapid a-fibrillation with PVCs. Interpretation: QTc duration of 474 ms. No STEMI. Records Reviewed: Nursing Notes and Old Medical Records (Time of Review: 12:47 PM)    ED Course: Progress Notes, Reevaluation, and Consults:    2:20 PM: Re-evaluated pt. Pt is feeling better. She is awake, alert and speaking with me, asked for the lights to be turned off. BP better after 2L IVF bolus, but rate remains elevated. Will plan for cardizem drip for rate control, now that BP stable with fluids. Lactate elevation and troponin likely [id number] to severe dehydration and rapid afib. No infectious signs. 7:35 PM Urine not sent (due to acuity of other patients in critical zone), but will be sent. No antibiotics given as patient without fever or leukocytosis. Will hold off pending UA (patient denies symptoms). Repeat lactic acid 2.7, down from 10.9 after fluids. Rate better controlled, but still tachycardic (rapid afib w/RVR). Consult: I discussed care with Dr. Feng Patiño (Hospitalist).   It was a standard discussion including patient history, chief complaint, available diagnostic results, and predicted treatment course. Accepts admission. Will go to ICU due to titratable drip. ICU notified. Critical Care Time: The services I provided to this patient were to treat and/or prevent clinically significant deterioration that could result in the failure of one or more body systems and/or organ systems due to a-fib with RvR and hypotension. Services included the following:  -reviewing nursing notes and old charts  -vital sign assessments  -direct patient care  -medication orders and management  -interpreting and reviewing diagnostic studies/labs  -re-evaluations  -documentation time    Aggregate critical care time was 45 minutes, which includes only time during which I was engaged in work directly related to the patient's care as described above, whether I was at bedside or elsewhere in the Emergency Department. It did not include time spent performing other reported procedures or the services of residents, students, nurses, or advance practice providers. Dagmar Landry MD    7:09 PM      For Hospitalized Patients:    1. Hospitalization Decision Time:  The decision to hospitalize the patient was made by Dr. Cristina Valente at [address] [place] [address] on 6/4/2018     Diagnosis     Clinical Impression:   1. Hypovolemia    2. Atrial fibrillation with RVR ([address]    3. Renal insufficiency        Disposition: Admit     Patient's Medications    No medications on file     _______________________________    Attestations:  Scribe Attestation     Willy Gonzalez acting as a scribe for and in the presence of Dagmar Landry MD      June 04, 2018 at 12:47 PM       Provider Attestation:      I personally performed the services described in the documentation, reviewed the documentation, as recorded by the scribe in my presence, and it accurately and completely records my words and actions.  June 04, 2018 at 12:47 PM - Dagmar Landry MD    _______________________________

## 2018-06-05 NOTE — PROGRESS NOTES
Bedside and Verbal shift change report given to Jean Bumpers, RN (oncoming nurse) by Corina Marquez RN   (offgoing nurse). Report included the following information SBAR, Kardex, Intake/Output, MAR and Recent Results.

## 2018-06-05 NOTE — PROGRESS NOTES
[facility]  [facility]    Patient: Irena Jordan Age: 80 y.o. : 1921 MR#: [id number] SSN: [id number]  Date/Time: 2018     Subjective: pt AAOx1, remains confused. Son at bedside   C/o mild abd pain, no CP or SOB. Pt failed SLP eval but Per son she been coughing with drinking for long time, pt doesn't want any PEG or TF, wants DNR. Assessment/Plan:   1. Acute metabolic encephalopathy: due to # 2, will monitor   2. UTI: cont Abx, follow up Cx  3. VALENTE: cont IVF and monitor   4. Metabolic acidosis, acute, anion gap due to # 2/3: will monitor   5. Diarrhea: will get C diff toxin   6. Paroxysmal atrial fibrillation with RVR: rate better in low 100's, off Cardizem drip, will cont IV BB with holding parameters   7. Elevated trop: demand ischemia vs related to # 3 and # 5, no CP, son doesn't want aggressive measures but ok for cardio eval and med Rx. He is not comfortable with anticoagulation for now. Will trend CE  8. Severe Sepsis d/t # 2 - VALENTE and encephalopathy with clear infectious source, cont Abx. 9. Dysphagia: will re-eval with SLP in am and MBS. 10. Lactic acidosis: improved   11. Advanced age  15. DNR  D/w pt's son Libertad Mullins [phone] at bedside in detail, no aggressive measures. No PEG or TF. Repeat BP 96/48 and  to 110's. RN to validate       I spent 30 minutes with the patient in face-to-face consultation, of which greater than 50% was spent in counseling and coordination of care as described above.     Case discussed with:  [x]Patient  [x]Family  [x]Nursing  []Case Management  DVT Prophylaxis:  []Lovenox  [x]Hep SQ  []SCDs  []Coumadin   []On Heparin gtt    Objective:   VS:   Visit Vitals    BP (!) 83/60    Pulse (!) 131    Temp 97.5 °F (36.4 °C)    Resp 28    Wt 51.3 kg (113 lb)    SpO2 91%      Tmax/24hrs: Temp (24hrs), Av.2 °F (36.2 °C), Min:97 °F (36.1 °C), Max:97.5 °F (36.4 °C)  IOBRIEF  Intake/Output Summary (Last 24 hours) at 06/05/18 1608  Last data filed at 06/05/18 0600   Gross per 24 hour   Intake              123 ml   Output                0 ml   Net              123 ml         General:  Alert, Mor[place], no acute distress    Pulmonary:  Bilaterally clear, no Rales. Cardiovascular: Regular rate and Rhythm. GI:  Soft, Non distended, Non tender. + Bowel sounds. Extremities:  No edema, cyanosis, clubbing. Neurologic: Alert and oriented X 1.  Moves all ext  Additional:    Medications:   Current Facility-Administered Medications   Medication Dose Route Frequency    acetaminophen (TYLENOL) tablet 500 mg  500 mg Oral Q4H PRN    heparin (porcine) injection 5,000 Units  5,000 Units SubCUTAneous Q8H    cefTRIAXone (ROCEPHIN) 2 g in sterile water (preservative free) 20 mL IV syringe  2 g IntraVENous Q24H    glucose chewable tablet 16 g  4 Tab Oral PRN    glucagon (GLUCAGEN) injection 1 mg  1 mg IntraMUSCular PRN    dextrose (D50W) injection syrg 12.5-25 g  25-50 mL IntraVENous PRN    0.45% sodium chloride infusion  75 mL/hr IntraVENous CONTINUOUS    metoprolol (LOPRESSOR) injection 5 mg  5 mg IntraVENous Q6H    Please obtain patient height and add to computer ASAP  1 Each Other ONCE    dilTIAZem (CARDIZEM) 100 mg in 0.9% sodium chloride (MBP/ADV) 100 mL infusion  0-15 mg/hr IntraVENous TITRATE    famotidine (PF) (PEPCID) 20 mg in sodium chloride 0.9% 10 mL injection  20 mg IntraVENous DAILY       Labs:    Recent Results (from the past 24 hour(s))   EKG, 12 LEAD, INITIAL    Collection Time: 06/04/18  4:26 PM   Result Value Ref Range    Ventricular Rate 150 BPM    Atrial Rate 150 BPM    QRS Duration 76 ms    Q-T Interval 300 ms    QTC Calculation (Bezet) 474 ms    Calculated R Axis 7 degrees    Calculated T Axis -95 degrees    Diagnosis       Probable Atrial fibrillation with occasional premature ventricular complexes  Low voltage QRS  Possible Inferior infarct , age undetermined  Cannot rule out Anterior infarct , age undetermined  Abnormal ECG  No previous ECGs available  Confirmed by Maryuri Larios MD, Gretchen Sanchez ([id number]) on 6/5/2018 1:44:20 PM     POC LACTIC ACID    Collection Time: 06/04/18  7:27 PM   Result Value Ref Range    Lactic Acid (POC) 2.7 (HH) 0.4 - 2.0 mmol/L   URINALYSIS W/ RFLX MICROSCOPIC    Collection Time: 06/04/18  9:40 PM   Result Value Ref Range    Color DARK YELLOW      Appearance TURBID      Specific gravity 1.014 1.005 - 1.030      pH (UA) 5.5 5.0 - 8.0      Protein 100 (A) NEG mg/dL    Glucose NEGATIVE  NEG mg/dL    Ketone 15 (A) NEG mg/dL    Bilirubin NEGATIVE  NEG      Blood LARGE (A) NEG      Urobilinogen 1.0 0.2 - 1.0 EU/dL    Nitrites NEGATIVE  NEG      Leukocyte Esterase LARGE (A) NEG     URINE MICROSCOPIC ONLY    Collection Time: 06/04/18  9:40 PM   Result Value Ref Range    WBC TOO NUMEROUS TO COUNT 0 - 4 /hpf    RBC POSITIVE 0 - 5 /hpf    Bacteria 4+ (A) NEG /hpf   CULTURE, BLOOD    Collection Time: 06/04/18  9:50 PM   Result Value Ref Range    Special Requests: NO SPECIAL REQUESTS      Culture result: NO GROWTH AFTER 11 HOURS     POC LACTIC ACID    Collection Time: 06/04/18 10:11 PM   Result Value Ref Range    Lactic Acid (POC) 2.6 (HH) 0.4 - 2.0 mmol/L   POC G3    Collection Time: 06/04/18 11:28 PM   Result Value Ref Range    Device: NASAL CANNULA      Flow rate (POC) 3 L/M    pH (POC) 7.396 7.35 - 7.45      pCO2 (POC) 24.2 (L) 35.0 - 45.0 MMHG    pO2 (POC) 150 (H) 80 - 100 MMHG    HCO3 (POC) 14.8 (L) 22 - 26 MMOL/L    sO2 (POC) 99 (H) 92 - 97 %    Base deficit (POC) 8 mmol/L    Allens test (POC) YES      Total resp.  rate 25      Site RIGHT RADIAL      Specimen type (POC) ARTERIAL      Performed by Eda Bergman    METABOLIC PANEL, BASIC    Collection Time: 06/05/18 12:05 AM   Result Value Ref Range    Sodium 147 (H) 136 - 145 mmol/L    Potassium 4.2 3.5 - 5.5 mmol/L    Chloride 114 (H) 100 - 108 mmol/L    CO2 18 (L) 21 - 32 mmol/L    Anion gap 15 3.0 - 18 mmol/L    Glucose 142 (H) 74 - 99 mg/dL    BUN 83 (H) 7.0 - 18 MG/DL    Creatinine 1.90 (H) 0.6 - 1.3 MG/DL    BUN/Creatinine ratio 44 (H) 12 - 20      GFR est AA 30 (L) >60 ml/min/1.73m2    GFR est non-AA 25 (L) >60 ml/min/1.73m2    Calcium 9.2 8.5 - 10.1 MG/DL   CARDIAC PANEL,(CK, CKMB & TROPONIN)    Collection Time: 06/05/18 12:05 AM   Result Value Ref Range     (H) 26 - 192 U/L    CK - MB 27.4 (H) <3.6 ng/ml    CK-MB Index 3.4 0.0 - 4.0 %    Troponin-I, Qt. 0.52 (H) 0.0 - 0.045 NG/ML   CBC WITH AUTOMATED DIFF    Collection Time: 06/05/18 12:05 AM   Result Value Ref Range    WBC 16.9 (H) 4.6 - 13.2 K/uL    RBC 4.19 (L) 4.20 - 5.30 M/uL    HGB 12.5 12.0 - 16.0 g/dL    HCT 37.0 35.0 - 45.0 %    MCV 88.3 74.0 - 97.0 FL    MCH 29.8 24.0 - 34.0 PG    MCHC 33.8 31.0 - 37.0 g/dL    RDW 15.4 (H) 11.6 - 14.5 %    PLATELET 949 452 - 671 K/uL    MPV 10.8 9.2 - 11.8 FL    NEUTROPHILS 76 (H) 42 - 75 %    BAND NEUTROPHILS 15 (H) 0 - 5 %    LYMPHOCYTES 5 (L) 20 - 51 %    MONOCYTES 4 2 - 9 %    EOSINOPHILS 0 0 - 5 %    BASOPHILS 0 0 - 3 %    ABS. NEUTROPHILS 15.4 (H) 1.8 - 8.0 K/UL    ABS. LYMPHOCYTES 0.8 0.8 - 3.5 K/UL    ABS. MONOCYTES 0.7 0 - 1.0 K/UL    ABS. EOSINOPHILS 0.0 0.0 - 0.4 K/UL    ABS.  BASOPHILS 0.0 0.0 - 0.06 K/UL    DF MANUAL      PLATELET COMMENTS ADEQUATE PLATELETS      RBC COMMENTS ANISOCYTOSIS  1+        RBC COMMENTS OVALOCYTES  1+       MAGNESIUM    Collection Time: 06/05/18 12:05 AM   Result Value Ref Range    Magnesium 2.1 1.6 - 2.6 mg/dL   PHOSPHORUS    Collection Time: 06/05/18 12:05 AM   Result Value Ref Range    Phosphorus 5.2 (H) 2.5 - 4.9 MG/DL   PROTHROMBIN TIME + INR    Collection Time: 06/05/18 12:05 AM   Result Value Ref Range    Prothrombin time 16.0 (H) 11.5 - 15.2 sec    INR 1.3 (H) 0.8 - 1.2     PTT    Collection Time: 06/05/18 12:05 AM   Result Value Ref Range    aPTT 30.2 23.0 - 36.4 SEC   TSH 3RD GENERATION    Collection Time: 06/05/18 12:05 AM   Result Value Ref Range    TSH 0.53 0.36 - 3.74 uIU/mL   POC LACTIC ACID Collection Time: 06/05/18  1:24 AM   Result Value Ref Range    Lactic Acid (POC) 2.1 (HH) 0.4 - 2.0 mmol/L   EKG, 12 LEAD, SUBSEQUENT    Collection Time: 06/05/18  1:46 AM   Result Value Ref Range    Ventricular Rate 141 BPM    Atrial Rate 141 BPM    QRS Duration 70 ms    Q-T Interval 356 ms    QTC Calculation (Bezet) 545 ms    Calculated R Axis 27 degrees    Calculated T Axis -78 degrees    Diagnosis       Atrial fibrillation with rapid ventricular response with premature   ventricular or aberrantly conducted complexes  Low voltage QRS  Nonspecific T wave abnormality , probably digitalis effect  Abnormal ECG  When compared with ECG of 04-JUN-2018 16:26,  No significant change was found  Confirmed by Bismark Nguyen MD, Jean-Pierre Dockery ([id number]) on 6/5/2018 2:01:59 PM     GLUCOSE, POC    Collection Time: 06/05/18  5:27 AM   Result Value Ref Range    Glucose (POC) 104 70 - 110 mg/dL   CARDIAC PANEL,(CK, CKMB & TROPONIN)    Collection Time: 06/05/18  7:03 AM   Result Value Ref Range     (H) 26 - 192 U/L    CK - MB 25.3 (H) <3.6 ng/ml    CK-MB Index 3.7 0.0 - 4.0 %    Troponin-I, Qt. 0.52 (H) 0.0 - 9.204 NG/ML   METABOLIC PANEL, BASIC    Collection Time: 06/05/18  7:03 AM   Result Value Ref Range    Sodium 147 (H) 136 - 145 mmol/L    Potassium 4.5 3.5 - 5.5 mmol/L    Chloride 113 (H) 100 - 108 mmol/L    CO2 18 (L) 21 - 32 mmol/L    Anion gap 16 3.0 - 18 mmol/L    Glucose 138 (H) 74 - 99 mg/dL    BUN 87 (H) 7.0 - 18 MG/DL    Creatinine 1.81 (H) 0.6 - 1.3 MG/DL    BUN/Creatinine ratio 48 (H) 12 - 20      GFR est AA 31 (L) >60 ml/min/1.73m2    GFR est non-AA 26 (L) >60 ml/min/1.73m2    Calcium 9.0 8.5 - 10.1 MG/DL   LACTIC ACID    Collection Time: 06/05/18  7:03 AM   Result Value Ref Range    Lactic acid 1.8 0.4 - 2.0 MMOL/L   GLUCOSE, POC    Collection Time: 06/05/18 11:15 AM   Result Value Ref Range    Glucose (POC) 109 70 - 110 mg/dL       Signed By: Simran Story MD     June 5, 2018

## 2018-06-05 NOTE — PROGRESS NOTES
Attempted dysphagia evaluation; however, pt refusing despite encouragement. She became combative at that time stating, \"Leave me alone. Tell the doctors I told you to go away. \" Will attempt later this date or as medical condition permits.      Thank you for this referral.    Isabela Gomez M.S. CCC-SLP/L  Speech-Language Pathologist

## 2018-06-05 NOTE — H&P
Hospitalist Admission Note    NAME: Irena Jordan   :  1921   MRN:  [id number]     Date/Time of admission:  2018 11:09 PM    Patient PCP: None  ________________________________________________________________________    My assessment of this patient's clinical condition and my plan of care is as follows. Assessment / Plan:  1. Acute metabolic encephalopathy  2. UTI  3. VALENTE  4. Metabolic acidosis, acute, anion gap  5. Paroxysmal atrial fibrillation with rvr  6. Severe Sepsis d/t #2 - VALENTE and encephalopathy with clear infectious source  7. Advanced age    3. Admit to stepdown for IV dilt gtt and 7.5 (nontitrated) and gentle hydration  2. Start IV rocephin in setting of uti   3. Monitor renal function and lytes; should improve as creatinine baseline is 1.1 in 2018 per [facility] after hours nurse  4. Neuro checks  5. Monitor for need to give bicarb gtt, but hold for now   6. Keep son updated  7. DNR with limits    Code Status: DNR with limits  Surrogate Decision Maker: pt's son    DVT Prophylaxis: sc hep  GI Prophylaxis: not indicated          Subjective:   CHIEF COMPLAINT: ams    HISTORY OF PRESENT ILLNESS:     Rachna Dunn is a 80 y.o.  female who presents with confusion and weakness. Pt resides in [facility] and goes to [facility] throughout the week. She is usually very interactive and talkative. Pt was noted to be slumped over in her chair today at [facility] and pt was sent to the ED. She was noted to be hypotensive and in RVR. She was given some fluids and bp responded appropriately. She was then started on dilt gtt and medicine was called. She was afebrile with a normal wbc. We were asked to admit for work up and evaluation of the above problems. Upon further investigation, I was able to reach pt's NH who referred me to [facility] and her [place] physician, Dr. Micaela Torres at [phone] and her other PCP, Dr. Roland Steel at [phone].  The [facility] after hours nurse was able to give me recent labs showing that pts creatinine was 1.1 a couple of months ago and her BUN was 40. She also had a bicarb of 29. Further history was that pt was confused over the weekend and they thought pt may have a UTI, but they were not able to get urine from her. I requested urine for a UA and she showed strong evidence for a UTI. No past medical history on file. - need to gather more from [facility] when possible    No past surgical history on file. - need to gather more from [facility] when possible    Social History   Substance Use Topics    Smoking status: Not on file    Smokeless tobacco: Not on file    Alcohol use Not on file    - need to gather more from [facility] when possible    No family history on file. - need to gather more from [facility] when possible  No Known Allergies - need to gather more from [facility] when possible    Prior to Admission medications    Not on File   - need to gather more from [facility] when possible    REVIEW OF SYSTEMS:     I am not able to complete the review of systems because:    The patient is intubated and sedated   x The patient has altered mental status due to his acute medical problems    The patient has baseline aphasia from prior stroke(s)    The patient has baseline dementia and is not reliable historian    The patient is in acute medical distress and unable to provide information           Total of 12 systems reviewed as follows:       POSITIVE= bolded text  Negative = text not underlined  General:  fever, chills, sweats, generalized weakness, weight loss/gain,      loss of appetite   Eyes:    blurred vision, eye pain, loss of vision, double vision  ENT:    rhinorrhea, pharyngitis   Respiratory:   cough, sputum production, SOB, DIXON, wheezing, pleuritic pain   Cardiology:   chest pain, palpitations, orthopnea, PND, edema, syncope   Gastrointestinal:  abdominal pain , N/V, diarrhea, dysphagia, constipation, bleeding   Genitourinary:  frequency, urgency, dysuria, hematuria, incontinence   Muskuloskeletal : arthralgia, myalgia, back pain  Hematology:  easy bruising, nose or gum bleeding, lymphadenopathy   Dermatological: rash, ulceration, pruritis, color change / jaundice  Endocrine:   hot flashes or polydipsia   Neurological:  headache, dizziness, confusion, focal weakness, paresthesia,     Speech difficulties, memory loss, gait difficulty  Psychological: Feelings of anxiety, depression, agitation    Objective:   VITALS:    Visit Vitals    /67    Pulse (!) 141    Temp 97.8 °F (36.6 °C)    Resp 25    Wt 51.3 kg (113 lb)    SpO2 99%       PHYSICAL EXAM:    General:    Laying with eyes closed, but responds to say \"i can hear you\" and \"speak into my left ear. \"   HEENT: Atraumatic, anicteric sclerae, pink conjunctivae     No oral ulcers, mucosa moist, throat clear  Neck:  Supple, symmetrical,  thyroid: non tender  Lungs:   Clear to auscultation bilaterally. No Wheezing or Rhonchi. No rales. Chest wall:  No tenderness  No Accessory muscle use. Heart:   irreg/irreg,  Trace holosystolic murmur   No edema  Abdomen:   Soft, non-tender. Not distended. Bowel sounds normal  Extremities: No cyanosis. No clubbing,      Skin turgor normal, Capillary refill normal, Radial dial pulse 2+  Skin:     Not pale. Not Jaundiced  No rashes   Psych:  Pleasantly disoriented  Neurologic: EOMs intact. No facial asymmetry. No aphasia or slurred speech. Symmetrical strength, Sensation grossly intact.  Alert and oriented X 1.     _______________________________________________________________________  Care Plan discussed with:    Comments   Patient x    Family  x    RN x    Care Manager                    Consultant:      _______________________________________________________________________  Expected  Disposition:   Home with Family    HH/PT/OT/RN    SNF/LTC x   IVY    ________________________________________________________________________  TOTAL TIME:  72 Minutes    Critical Care Provided     Minutes non procedure based Comments    x Reviewed previous records   >50% of visit spent in counseling and coordination of care x Discussion with patient and/or family and questions answered       ________________________________________________________________________      Procedures: see electronic medical records for all procedures/Xrays and details which were not copied into this note but were reviewed prior to creation of Plan. LAB DATA REVIEWED:    Recent Results (from the past 24 hour(s))   CBC WITH AUTOMATED DIFF    Collection Time: 06/04/18 12:45 PM   Result Value Ref Range    WBC 8.8 4.6 - 13.2 K/uL    RBC 4.48 4.20 - 5.30 M/uL    HGB 13.2 12.0 - 16.0 g/dL    HCT 40.4 35.0 - 45.0 %    MCV 90.2 74.0 - 97.0 FL    MCH 29.5 24.0 - 34.0 PG    MCHC 32.7 31.0 - 37.0 g/dL    RDW 15.4 (H) 11.6 - 14.5 %    PLATELET 987 179 - 336 K/uL    MPV 11.0 9.2 - 11.8 FL    NEUTROPHILS 72 42 - 75 %    LYMPHOCYTES 26 20 - 51 %    MONOCYTES 2 2 - 9 %    EOSINOPHILS 0 0 - 5 %    BASOPHILS 0 0 - 3 %    ABS. NEUTROPHILS 6.3 1.8 - 8.0 K/UL    ABS. LYMPHOCYTES 2.3 0.8 - 3.5 K/UL    ABS. MONOCYTES 0.2 0 - 1.0 K/UL    ABS. EOSINOPHILS 0.0 0.0 - 0.4 K/UL    ABS. BASOPHILS 0.0 0.0 - 0.06 K/UL    DF MANUAL      PLATELET COMMENTS ADEQUATE PLATELETS      RBC COMMENTS ANISOCYTOSIS  1+        RBC COMMENTS OVALOCYTES  FEW       METABOLIC PANEL, COMPREHENSIVE    Collection Time: 06/04/18 12:45 PM   Result Value Ref Range    Sodium 142 136 - 145 mmol/L    Potassium 4.8 3.5 - 5.5 mmol/L    Chloride 108 100 - 108 mmol/L    CO2 12 (L) 21 - 32 mmol/L    Anion gap 22 (H) 3.0 - 18 mmol/L    Glucose 102 (H) 74 - 99 mg/dL    BUN 82 (H) 7.0 - 18 MG/DL    Creatinine 2.47 (H) 0.6 - 1.3 MG/DL    BUN/Creatinine ratio 33 (H) 12 - 20      GFR est AA 22 (L) >60 ml/min/1.73m2    GFR est non-AA 18 (L) >60 ml/min/1.73m2    Calcium 11.2 (H) 8.5 - 10.1 MG/DL    Bilirubin, total 1.6 (H) 0.2 - 1.0 MG/DL    ALT (SGPT) 16 13 - 56 U/L    AST (SGOT) 30 15 - 37 U/L    Alk.  phosphatase 93 45 - 117 U/L    Protein, total 8.5 (H) 6.4 - 8.2 g/dL    Albumin 3.5 3.4 - 5.0 g/dL    Globulin 5.0 (H) 2.0 - 4.0 g/dL    A-G Ratio 0.7 (L) 0.8 - 1.7     TROPONIN I    Collection Time: 06/04/18 12:45 PM   Result Value Ref Range    Troponin-I, Qt. 0.07 (H) 0.0 - 0.045 NG/ML   POC LACTIC ACID    Collection Time: 06/04/18 12:50 PM   Result Value Ref Range    Lactic Acid (POC) 10.9 (HH) 0.4 - 2.0 mmol/L   POC LACTIC ACID    Collection Time: 06/04/18  7:27 PM   Result Value Ref Range    Lactic Acid (POC) 2.7 (HH) 0.4 - 2.0 mmol/L   URINALYSIS W/ RFLX MICROSCOPIC    Collection Time: 06/04/18  9:40 PM   Result Value Ref Range    Color DARK YELLOW      Appearance TURBID      Specific gravity 1.014 1.005 - 1.030      pH (UA) 5.5 5.0 - 8.0      Protein 100 (A) NEG mg/dL    Glucose NEGATIVE  NEG mg/dL    Ketone 15 (A) NEG mg/dL    Bilirubin NEGATIVE  NEG      Blood LARGE (A) NEG      Urobilinogen 1.0 0.2 - 1.0 EU/dL    Nitrites NEGATIVE  NEG      Leukocyte Esterase LARGE (A) NEG     URINE MICROSCOPIC ONLY    Collection Time: 06/04/18  9:40 PM   Result Value Ref Range    WBC TOO NUMEROUS TO COUNT 0 - 4 /hpf    RBC POSITIVE 0 - 5 /hpf    Bacteria 4+ (A) NEG /hpf   POC LACTIC ACID    Collection Time: 06/04/18 10:11 PM   Result Value Ref Range    Lactic Acid (POC) 2.6 (HH) 0.4 - 2.0 mmol/L       Zoie Ortiz MD  Internal Medicine  Hospitalist Division

## 2018-06-05 NOTE — PROGRESS NOTES
Problem: Dysphagia (Adult)  Goal: *Acute Goals and Plan of Care (Insert Text)  Patient will:  1. Tolerate PO trials with 0 s/s overt distress in 4/5 trials  2. Utilize compensatory swallow strategies/maneuvers (decrease bite/sip, size/rate, alt. liq/sol) with min cues in 4/5 trials  3. Perform oral-motor/laryngeal exercises to increase oropharyngeal swallow function with min cues  4. Complete an objective swallow study (i.e., MBSS) to assess swallow integrity, r/o aspiration, and determine of safest LRD, min A    Recommend:   NPO with alternate means of nutrition/hydration vs comfort feeds  Strict aspiration precautions (HOB >30 degrees at all times, Oral care TID)    Speech LAnguage Pathology bedside swallow evaluation/treatment     Patient: Denys Jameson (80 y.o. female)  Date: 6/5/2018  Primary Diagnosis: Atrial fibrillation with RVR (HCC)  Atrial fibrillation with RVR (HCC)  Acute metabolic encephalopathy        Precautions: aspiration       ASSESSMENT :  Based on the objective data described below, the patient presents with severe pharyngeal dysphagia c/b overt strong cough, increase in RR, and altered vocal quality post all PO trials. Suspect pt is at high risk for dehydration, malnutrition, and aspiration with PO. Pt's son reports coughing with all PO for the last few months. Laryngeal elevation was delayed/weak to palpation. Rec NPO with consideration of an alternate means of nutrition/hydration, aspiration precautions, and oral care TID. D/w RN and MD. Luke Parks will continue to follow. Patient will benefit from skilled intervention to address the above impairments.   Patients rehabilitation potential is considered to be Guarded  Factors which may influence rehabilitation potential include:   [x]            Mental ability/status  [x]            Medical condition     PLAN :  Recommendations and Planned Interventions: See above  Frequency/Duration: Patient will be followed by speech-language pathology 1-2 times per day/4-7 days per week to address goals. Discharge Recommendations: [facility] and To Be Determined     SUBJECTIVE:   Patient stated I'm not doing anymore. OBJECTIVE:   History reviewed. No pertinent past medical history. History reviewed. No pertinent surgical history. Prior Level of Function/Home Situation: unknown  Diet prior to admission: reg with thin per son  Current Diet:  NPO   Cognitive and Communication Status:  Neurologic State: Alert, Confused, Irritable  Orientation Level: Oriented to person  Cognition: Impulsive, Impaired decision making, Decreased command following  Oral Assessment:  Oral Assessment  Labial: No impairment  Dentition: Intact  Oral Hygiene: adequate  Lingual: No impairment  Velum: No impairment  Mandible: No impairment  P.O. Trials:  Patient Position: [facility] at [facility]  Vocal quality prior to P.O.: No impairment  Consistency Presented: Nectar thick liquid;Honey thick liquid; Thin liquid;Puree  How Presented: Self-fed/presented;Cup/sip;Spoon  Bolus Acceptance: No impairment  Bolus Formation/Control: No impairment  Propulsion: No impairment  Oral Residue: None  Initiation of Swallow: No impairment  Laryngeal Elevation: Weak;Decreased  Aspiration Signs/Symptoms: Change vocal quality; Increase in RR;Delayed cough/throat clear;Strong cough; Infiltrate on chest xray  Pharyngeal Phase Characteristics: Suspected pharyngeal residue;Poor endurance;Multiple swallows; Altered vocal quality; Easily fatigued   Effective Modifications: None  Cues for Modifications: Moderate-maximal     Oral Phase Severity: No impairment  Pharyngeal Phase Severity : Severe     Treatment Post Evaluation:  Treatment provided post evaluation including education of oropharyngeal anatomy/physiology, bedside swallow results, risk of aspiration with PO, and recs of NPO with an alternate means of nutrition/hydration vs comfort feeds. Pt's son verbalized understanding stating, \"I don't think we want the tube. \" ST will continue to follow. GCODESwallowing:  Swallow Current Status CN= 100%   Swallow Goal Status CM= 80-99%    The severity rating is based on the following outcomes:    Clinical Judgment    Pain:  Pt c/o 0/10 pain prior to evaluation. Pt c/o 0/10 pain post evaluation. After treatment:   []            Patient left in no apparent distress sitting up in chair  [x]            Patient left in no apparent distress in bed  [x]            Call bell left within reach  [x]            Nursing notified  []            Caregiver present  []            Bed alarm activated    COMMUNICATION/EDUCATION:   [x]            SLP educated pt and pt's son on aspiration risk and diet recs. Son verbalized understanding. [x]            Patient/family have participated as able in goal setting and plan of care.     Thank you for this referral.    Dorothy Hardy M.S. CCC-SLP/L  Speech-Language Pathologist

## 2018-06-05 NOTE — PROGRESS NOTES
This writer spoke with Theresa LOPEZ care coordinator for this patient. She states that patient is a long term care resident at [facility] [place]. She has [facility] for the day time. She still has a bed at [facility] [place] and can be transitioned to skilled care at [facility] [place] if needed at d/c. Keyona Gonzales wants to be notiifed at the time of d/c [phone].

## 2018-06-05 NOTE — PROGRESS NOTES
Dysphagia eval completed with recs of NPO with consideration of an alternate means of nutrition/hydration vs comfort feeds. Full report to follow.      Thank you for this referral.    Tremaine Sorenson M.S. CCC-SLP/L  Speech-Language Pathologist

## 2018-06-05 NOTE — CONSULTS
[employer] [facility]  Pulmonary, Critical Care, and Sleep Medicine      Name: Lesa Wan MRN: [id number]   : 1921 Hospital: [facility]   Date: 2018          Critical Care Initial Patient Consult    Requesting MD:  Dr. Nereyda Golden                                                Reason for CC Consult: atrial fib with RVR    IMPRESSION:   · Atrial fibrillation with RVR  · Severe sepsis, possible urinary tract infection with abnormal UA - large LE, TNTC WBC, + bacteriuria  · Metabolic acidosis with anion gap acidosis  · Acute encephalopathy/ altered mental status, possibly toxic/metabolic from underlying sepsis? Need to r/o acute bleed  · Acute kidney injury  · Hx chronic afib on anticoagulation with coumadin in the past  · Hx CAD, GERD      RECOMMENDATIONS:   · Resp -stable on room air  · ID - follow blood c/s; obtain urine c/s. Continue ceftriaxone and de-escalate once cultures finalize  · CVS - monitor hemodynamics closely. Trend cardiac enzymes. Actively wean cardizem gtt, titrate for HR goal < 100. Check PT/PTT in am  · Heme/Onc- stable hgb/hct, plt. Check coags (prior coumadin use)  · Metabolic - monitor electrolytes and acidosis- repeat BMP and obtain ABG now -acidosis improving. Start gentle hydration with NS IVF   · Renal - no beard -monitor UO as best as possible, may do frequent bladder checks and straight cath as needed; trend renal indices  · Endocrine - frequent glycemic checks while NPO. Obtain TSH in am  · Neuro/ Pain/ Sedation -avoid sedating medications. Obtain stat CT head - r/o acute bleed (hx of coumadin anticoagulation)  · GI - SLP in am. Strict aspiration precautions  · Wound care  · Prophylaxis - DVT, GI     Subjective/History: This patient has been seen and evaluated at the request of Dr. Nereyda Golden for atrial fib with RVR.   Patient is a 80 y.o. female nursing home resident of [place], with medical hx significant for chronic afib on coumadin anticoagulation, CAD, GERD presented to the ED for altered mental status. Hx obtained from review of medical records as pt is a poor historian. Per ED, pt was found slumped over in chair at PCP office, dry and non-verbal; at baseline active and conversational. In ED, pt was found to be very tachycardic in 160-170 range; hypotensive initially. Initial w/u in ED significant for elevated lactate that has progressively improved over time; abnormal UA; elevated crea. Pt was given 2L NS bolus however HR remained tachycardic and hence was started on cardizem infusion. ICU was consulted for titratable infusion with cardizem for HR control. The patient is critically ill and can not provide additional history due to altered mental status and poor historian. No past medical history on file. No past surgical history on file. Prior to Admission medications    Not on File     Current Facility-Administered Medications   Medication Dose Route Frequency    0.9% sodium chloride infusion  50 mL/hr IntraVENous CONTINUOUS    cefTRIAXone (ROCEPHIN) 2 g in sterile water (preservative free) 20 mL IV syringe  2 g IntraVENous Q24H    dilTIAZem (CARDIZEM) 100 mg in 0.9% sodium chloride (MBP/ADV) 100 mL infusion  0-15 mg/hr IntraVENous TITRATE    famotidine (PF) (PEPCID) 20 mg in sodium chloride 0.9% 10 mL injection  20 mg IntraVENous DAILY     No Known Allergies   Social History   Substance Use Topics    Smoking status: Not on file    Smokeless tobacco: Not on file    Alcohol use Not on file      No family history on file. Review of Systems:  Review of systems not obtained due to patient factors.     Objective:   Vital Signs:    Visit Vitals    /47    Pulse (!) 118    Temp 97.8 °F (36.6 °C)    Resp 26    Wt 51.3 kg (113 lb)    SpO2 100%       O2 Device: Room air       Temp (24hrs), Av.8 °F (36.6 °C), Min:97.8 °F (36.6 °C), Max:97.8 °F (36.6 °C)       Intake/Output:   Last shift:         Last 3 shifts:    No intake or output data in the 24 hours ending 06/05/18 0122      Physical Exam:    General:  Alert, somewhat cooperative, no distress, appears stated age. Head:  Normocephalic, without obvious abnormality   Eyes:  Pink palpebral conjunctivae, anicteric sclerae; EOMs intact. Nose: Nares normal. No drainage    Throat: Lips, mucosa, and tongue mildly dry   Neck: Supple, symmetrical, trachea midline, no adenopathy, thyroid: no enlargment/tenderness/nodules, no carotid bruit and no JVD. Lungs:   Clear to auscultation bilaterally. Chest wall:  No tenderness or deformity. Heart:  Irregular rate and rhythm, tachycardic   Abdomen:   Soft, non-tender. Bowel sounds normal.    Extremities: Extremities atraumatic, no cyanosis; mild-mod b/l LE edema. Pulses: 2+ and symmetric all extremities. Skin: + actinic keratosis most pronounced on facial area; +multiple bruising on b/l UE and LEs; + wound over anterior tibial area, L>R   Lymph nodes: No Cervical, supraclavicular enlargement   Neurologic: Grossly nonfocal -hard of hearing, prefers to hear on the left ear       Data:     Recent Results (from the past 24 hour(s))   CBC WITH AUTOMATED DIFF    Collection Time: 06/04/18 12:45 PM   Result Value Ref Range    WBC 8.8 4.6 - 13.2 K/uL    RBC 4.48 4.20 - 5.30 M/uL    HGB 13.2 12.0 - 16.0 g/dL    HCT 40.4 35.0 - 45.0 %    MCV 90.2 74.0 - 97.0 FL    MCH 29.5 24.0 - 34.0 PG    MCHC 32.7 31.0 - 37.0 g/dL    RDW 15.4 (H) 11.6 - 14.5 %    PLATELET 396 053 - 220 K/uL    MPV 11.0 9.2 - 11.8 FL    NEUTROPHILS 72 42 - 75 %    LYMPHOCYTES 26 20 - 51 %    MONOCYTES 2 2 - 9 %    EOSINOPHILS 0 0 - 5 %    BASOPHILS 0 0 - 3 %    ABS. NEUTROPHILS 6.3 1.8 - 8.0 K/UL    ABS. LYMPHOCYTES 2.3 0.8 - 3.5 K/UL    ABS. MONOCYTES 0.2 0 - 1.0 K/UL    ABS. EOSINOPHILS 0.0 0.0 - 0.4 K/UL    ABS.  BASOPHILS 0.0 0.0 - 0.06 K/UL    DF MANUAL      PLATELET COMMENTS ADEQUATE PLATELETS      RBC COMMENTS ANISOCYTOSIS  1+        RBC COMMENTS OVALOCYTES  FEW       METABOLIC PANEL, COMPREHENSIVE    Collection Time: 06/04/18 12:45 PM   Result Value Ref Range    Sodium 142 136 - 145 mmol/L    Potassium 4.8 3.5 - 5.5 mmol/L    Chloride 108 100 - 108 mmol/L    CO2 12 (L) 21 - 32 mmol/L    Anion gap 22 (H) 3.0 - 18 mmol/L    Glucose 102 (H) 74 - 99 mg/dL    BUN 82 (H) 7.0 - 18 MG/DL    Creatinine 2.47 (H) 0.6 - 1.3 MG/DL    BUN/Creatinine ratio 33 (H) 12 - 20      GFR est AA 22 (L) >60 ml/min/1.73m2    GFR est non-AA 18 (L) >60 ml/min/1.73m2    Calcium 11.2 (H) 8.5 - 10.1 MG/DL    Bilirubin, total 1.6 (H) 0.2 - 1.0 MG/DL    ALT (SGPT) 16 13 - 56 U/L    AST (SGOT) 30 15 - 37 U/L    Alk.  phosphatase 93 45 - 117 U/L    Protein, total 8.5 (H) 6.4 - 8.2 g/dL    Albumin 3.5 3.4 - 5.0 g/dL    Globulin 5.0 (H) 2.0 - 4.0 g/dL    A-G Ratio 0.7 (L) 0.8 - 1.7     TROPONIN I    Collection Time: 06/04/18 12:45 PM   Result Value Ref Range    Troponin-I, Qt. 0.07 (H) 0.0 - 0.045 NG/ML   POC LACTIC ACID    Collection Time: 06/04/18 12:50 PM   Result Value Ref Range    Lactic Acid (POC) 10.9 (HH) 0.4 - 2.0 mmol/L   POC LACTIC ACID    Collection Time: 06/04/18  7:27 PM   Result Value Ref Range    Lactic Acid (POC) 2.7 (HH) 0.4 - 2.0 mmol/L   URINALYSIS W/ RFLX MICROSCOPIC    Collection Time: 06/04/18  9:40 PM   Result Value Ref Range    Color DARK YELLOW      Appearance TURBID      Specific gravity 1.014 1.005 - 1.030      pH (UA) 5.5 5.0 - 8.0      Protein 100 (A) NEG mg/dL    Glucose NEGATIVE  NEG mg/dL    Ketone 15 (A) NEG mg/dL    Bilirubin NEGATIVE  NEG      Blood LARGE (A) NEG      Urobilinogen 1.0 0.2 - 1.0 EU/dL    Nitrites NEGATIVE  NEG      Leukocyte Esterase LARGE (A) NEG     URINE MICROSCOPIC ONLY    Collection Time: 06/04/18  9:40 PM   Result Value Ref Range    WBC TOO NUMEROUS TO COUNT 0 - 4 /hpf    RBC POSITIVE 0 - 5 /hpf    Bacteria 4+ (A) NEG /hpf   POC LACTIC ACID    Collection Time: 06/04/18 10:11 PM   Result Value Ref Range    Lactic Acid (POC) 2.6 (HH) 0.4 - 2.0 mmol/L   POC G3 Collection Time: 06/04/18 11:28 PM   Result Value Ref Range    Device: NASAL CANNULA      Flow rate (POC) 3 L/M    pH (POC) 7.396 7.35 - 7.45      pCO2 (POC) 24.2 (L) 35.0 - 45.0 MMHG    pO2 (POC) 150 (H) 80 - 100 MMHG    HCO3 (POC) 14.8 (L) 22 - 26 MMOL/L    sO2 (POC) 99 (H) 92 - 97 %    Base deficit (POC) 8 mmol/L    Allens test (POC) YES      Total resp. rate 25      Site RIGHT RADIAL      Specimen type (POC) ARTERIAL      Performed by [employer]    METABOLIC PANEL, BASIC    Collection Time: 06/05/18 12:05 AM   Result Value Ref Range    Sodium 147 (H) 136 - 145 mmol/L    Potassium 4.2 3.5 - 5.5 mmol/L    Chloride 114 (H) 100 - 108 mmol/L    CO2 18 (L) 21 - 32 mmol/L    Anion gap 15 3.0 - 18 mmol/L    Glucose 142 (H) 74 - 99 mg/dL    BUN 83 (H) 7.0 - 18 MG/DL    Creatinine 1.90 (H) 0.6 - 1.3 MG/DL    BUN/Creatinine ratio 44 (H) 12 - 20      GFR est AA 30 (L) >60 ml/min/1.73m2    GFR est non-AA 25 (L) >60 ml/min/1.73m2    Calcium 9.2 8.5 - 10.1 MG/DL   CARDIAC PANEL,(CK, CKMB & TROPONIN)    Collection Time: 06/05/18 12:05 AM   Result Value Ref Range     (H) 26 - 192 U/L    CK - MB 27.4 (H) <3.6 ng/ml    CK-MB Index 3.4 0.0 - 4.0 %    Troponin-I, Qt. 0.52 (H) 0.0 - 0.045 NG/ML             Telemetry:AFIB, with RVR    Imaging:  CXR Results  (Last 48 hours)               06/04/18 1317  XR CHEST PORT Final result    Impression:  IMPRESSION:       No evidence of CHF. Minimal streaky fibroses or subtle atelectasis in right lung base. Subtle   infiltrates are not excluded. But there is no evidence of confluent pneumonia or   pleural effusion. Narrative:  Portable chest x-ray, semierect AP view, time 1305 hours on 06/04/2018:               INDICATION:       Hypotension. Acute mental status change. COMPARISON STUDY: None. FINDINGS:       Cardiac size is normal. Pulmonary vascularity is not cephalized. In right lung   base there are mild streaky fibrotic or minimal atelectatic changes.  There is no   evidence of confluent pneumonia or pleural effusion. CP angles are sharp   bilaterally. No evidence of pneumothorax. 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                  CT Results  (Last 48 hours)    None                  January-Carin CHAMBERS PA-C

## 2018-06-06 NOTE — PROGRESS NOTES
Problem: Pressure Injury - Risk of  Goal: *Prevention of pressure injury  Document Vipul Scale and appropriate interventions in the flowsheet. Outcome: Progressing Towards Goal  Pressure Injury Interventions:  Sensory Interventions: Assess changes in LOC, Assess need for specialty bed, Discuss PT/OT consult with provider, Keep linens dry and wrinkle-free, Maintain/enhance activity level, Minimize linen layers, Turn and reposition approx. every two hours (pillows and wedges if needed), Suspension boots    Moisture Interventions: Absorbent underpads, Apply protective barrier, creams and emollients, Check for incontinence Q2 hours and as needed, Maintain skin hydration (lotion/cream)    Activity Interventions: Assess need for specialty bed, Pressure redistribution bed/mattress(bed type), PT/OT evaluation    Mobility Interventions: Pressure redistribution bed/mattress (bed type), Turn and reposition approx. every two hours(pillow and wedges), Suspension boots    Nutrition Interventions: Document food/fluid/supplement intake    Friction and Shear Interventions: Foam dressings/transparent film/skin sealants, HOB 30 degrees or less, Transferring/repositioning devices               Problem: Falls - Risk of  Goal: *Absence of Falls  Document Berny Fall Risk and appropriate interventions in the flowsheet.    Outcome: Progressing Towards Goal  Fall Risk Interventions:       Mentation Interventions: Bed/chair exit alarm, Door open when patient unattended, More frequent rounding, Reorient patient    Medication Interventions: Bed/chair exit alarm    Elimination Interventions: Bed/chair exit alarm, Toileting schedule/hourly rounds

## 2018-06-06 NOTE — PROGRESS NOTES
met with patient, completed the initial Spiritual Assessment of the patient, and offered Pastoral Care, see flow sheets for interventions. Patient was calling out and agitated. The nurse came into the room as patient was taking out her dentures. She pulled back when  touched her arm. She did not respond to conversation but talked quite a bit most of it unintelligible. Nurse said she just spoke with the physician about medication to help her be calmer. Brief Pastoral support provided. Chart reviewed. Chaplains will continue to follow and will provide pastoral care on an as needed/as requested basis. Jonas Rashid MDiv.   Board Certified [employer] [phone]

## 2018-06-06 NOTE — CONSULTS
[facility] - Consult Note    Date of  Admission: 6/4/2018 12:33 PM   Primary Care Physician:  None     Assessment:     Patient Active Problem List   Diagnosis Code    Atrial fibrillation with RVR ([facility]) I48.91    Acute metabolic encephalopathy D16.57     -PAfib with RVR, 2/2 to urosepsis, was on cardizem gtt but d/t hypotension was stopped, switched to IV lopressor, tolerating well.   -UTI, on IV abx  -Metabolic encephalopathy  -Indeterminate troponin, 2/2 to rapid afib, demand ischemia, 0.2  -Diarrhea, c diff sent  -Severe aspiration. Unable to tolerate any po  -Confusion  -Advanced age  -DNR     Plan:     -Would continue IV lopressor 2.5 q6 as hemodynamics will tolerate. HR in 120's is acceptable given acute urinary tract infection. -Given advanced age and prior discussion son had with Hospitalist team, son would not like aggressive measures and is not comfortable with anticoagulation. At most could recommend daily aspirin at 81mg. --Continue IVFs for renal insult. Consider comfort care measures since patient not able to eat  No need for further cardiac testing. Will be available if additional questions arise. History of Present Illness: This is a 80 y.o. female admitted for Atrial fibrillation with RVR ([facility] [address]  Atrial fibrillation with RVR ([facility] [address]  Acute metabolic encephalopathy. Patient is a 79yo  female who [facility] and was brought to this facility's ER for further evaluation when she was found slumped over in her chair and minimally responsive. Pt [facility] to the [facility] each day and is usually very interactive and talkative. She was noted to be hypotensive and in afib with RVR. Also found to have a UTI with VALENTE. Baseline Cr per her facility is 1.1. She has been intermittently confused and agitated as well. She initially was on a cardizem gtt but d/t hypotension this was stopped and switched to IV lopressor q6.  It is unclear if patient has a history of afib. It is also unclear what medications she is on as an outpatient. No echo on file. Pt is pleasantly confused during my evaluation with no family at bedside. It is noted in the EMR that her son is primary contact for medical decisions. Cardiac risk factors:   unknown      Review of Symptoms:  Unable to obtain d/t current medical condition. Past Medical History:   History reviewed. No pertinent past medical history. Social History:     Social History     Social History    Marital status: SINGLE     Spouse name: N/A    Number of children: N/A    Years of education: N/A     Social History Main Topics    Smoking status: None    Smokeless tobacco: None    Alcohol use None    Drug use: None    Sexual activity: Not Asked     Other Topics Concern    None     Social History Narrative    None        Family History:   History reviewed. No pertinent family history.      Medications:   No Known Allergies     Current Facility-Administered Medications   Medication Dose Route Frequency    dextrose 5 % - 0.45% NaCl infusion  75 mL/hr IntraVENous CONTINUOUS    [START ON 6/7/2018] famotidine (PF) (PEPCID) 20 mg in sodium chloride 0.9% 10 mL injection  20 mg IntraVENous QHS    acetaminophen (TYLENOL) tablet 500 mg  500 mg Oral Q4H PRN    heparin (porcine) injection 5,000 Units  5,000 Units SubCUTAneous Q8H    cefTRIAXone (ROCEPHIN) 2 g in sterile water (preservative free) 20 mL IV syringe  2 g IntraVENous Q24H    glucose chewable tablet 16 g  4 Tab Oral PRN    glucagon (GLUCAGEN) injection 1 mg  1 mg IntraMUSCular PRN    dextrose (D50W) injection syrg 12.5-25 g  25-50 mL IntraVENous PRN    metoprolol (LOPRESSOR) injection 2.5 mg  2.5 mg IntraVENous Q6H    Lactobacillus Acidoph & Bulgar (FLORANEX) tablet 2 Tab  2 Tab Oral BID         Physical Exam:     Visit Vitals    /58    Pulse 89    Temp 97.4 °F (36.3 °C)    Resp 22    Wt 42.8 kg (94 lb 4.8 oz)    SpO2 90%     BP Readings from Last 3 Encounters:   06/06/18 100/58     Pulse Readings from Last 3 Encounters:   06/06/18 89     Wt Readings from Last 3 Encounters:   06/06/18 42.8 kg (94 lb 4.8 oz)       General: awake, alert, confused, pleasant, frail appearing  Neck:  supple  Lungs:  Clear bilat on NC O2  Heart: irreg rate and rhythm, no murmur  Abdomen: thin, soft, no tenderness to palpation, no guarding  Extremities: no LE edema, LLE with guaze in place (noted to have superficial abrasion/skin tear by nurse), RLE with some hyperpigmentation, no open areas  Skin: warm and dry  Neuro: EOMs intact, moves upper extremities without difficulty  Psych: pleasantly confused     Data Review:     Recent Labs      06/06/18   0055  06/05/18   0005  06/04/18   1245   WBC  16.3*  16.9*  8.8   HGB  12.2  12.5  13.2   HCT  37.1  37.0  40.4   PLT  212  226  255     Recent Labs      06/06/18   0055  06/05/18   1940  06/05/18   0703  06/05/18   0005  06/04/18   1245   NA  145  148*  147*  147*  142   K  4.2  4.3  4.5  4.2  4.8   CL  115*  116*  113*  114*  108   CO2  16*  14*  18*  18*  12*   GLU  112*  105*  138*  142*  102*   BUN  93*  90*  87*  83*  82*   CREA  2.02*  1.94*  1.81*  1.90*  2.47*   CA  8.8  8.6  9.0  9.2  11.2*   MG   --    --    --   2.1   --    PHOS   --    --    --   5.2*   --    ALB  2.5*   --    --    --   3.5   SGOT  84*   --    --    --   30   ALT  54   --    --    --   16   INR   --    --    --   1.3*   --        Results for orders placed or performed during the hospital encounter of 06/04/18   EKG, 12 LEAD, INITIAL   Result Value Ref Range    Ventricular Rate 150 BPM    Atrial Rate 150 BPM    QRS Duration 76 ms    Q-T Interval 300 ms    QTC Calculation (Bezet) 474 ms    Calculated R Axis 7 degrees    Calculated T Axis -95 degrees    Diagnosis       Probable Atrial fibrillation with occasional premature ventricular complexes  Low voltage QRS  Possible Inferior infarct , age undetermined  Cannot rule out Anterior infarct , age undetermined  Abnormal ECG  No previous ECGs available  Confirmed by Bismark Nguyen MD, Jean-Pierre Dockery ([id number]) on 6/5/2018 1:44:20 PM         All Cardiac Markers in the last 24 hours:    Lab Results   Component Value Date/Time     (H) 06/06/2018 12:55 AM    TROIQ 0.21 (H) 06/05/2018 07:40 PM       Last Lipid:  No results found for: CHOL, CHOLX, CHLST, CHOLV, HDL, LDL, LDLC, DLDLP, TGLX, TRIGL, TRIGP, CHHD, CHHDX    Signed By: NABEEL Mancuso     June 6, 2018      Dakota Bolden MD

## 2018-06-06 NOTE — ROUTINE PROCESS
Bedside and Verbal shift change report given to [address] (oncoming nurse) by Megan Fraire RN (offgoing nurse). Report included the following information SBAR, Kardex, Intake/Output, Recent Results and Cardiac Rhythm . Lanre Flaherty

## 2018-06-06 NOTE — PROGRESS NOTES
[facility] Hospitalist Group  Progress Note    Patient: Ilsa Aaron Age: 80 y.o. : 1921 MR#: [id number] SSN: [id number]  Date/Time: 2018     Subjective: pt AAOx1, remains confused and agitated. S   Per RN, pt asking to eat and drink       Assessment/Plan:   1. Acute metabolic encephalopathy: due to # 2, will monitor   2. UTI: cont Abx, follow up Cx  3. VALENTE: crt wosre, increase IVF and monitor   4. Metabolic acidosis, acute, anion gap due to # 2/3: will monitor   5. Diarrhea: neg C diff toxin   6. Paroxysmal atrial fibrillation with RVR: rate better in low 100's, will cont IV BB with holding parameters   7. Elevated trop: demand ischemia vs related to # 3 and # 5, no CP, son doesn't want aggressive measures but ok for cardio eval and med Rx. Cardio in put noted  8. Severe Sepsis d/t # 2 - VALENTE and encephalopathy with clear infectious source, cont Abx. 9. Dysphagia: s/p re-eval with SLP failed again and not safe for MBS. 10. Lactic acidosis: improved   11. Advanced age  15. DNR  D/w pt's son Cecilia Kang [phone] over phone in detail including aggressive care vs comfort/hospise care. Son very clear no aggressive measures, no ICU care. No PEG or TF, he wants comfort feeds and keep pt comfort. He wants us try one more day with IVF and Abx if no improvement than comfort care or hospice care. D/w RN     Addendum:  No palliative care team in hospital, will consult hospice once family agree. I spent 30 minutes with the patient in face-to-face consultation, of which greater than 50% was spent in counseling and coordination of care as described above.     Case discussed with:  [x]Patient  [x]Family  [x]Nursing  []Case Management  DVT Prophylaxis:  []Lovenox  [x]Hep SQ  []SCDs  []Coumadin   []On Heparin gtt    Objective:   VS:   Visit Vitals    BP 98/55 (BP 1 Location: Left arm, BP Patient Position: At rest)    Pulse 75    Temp 97.7 °F (36.5 °C)    Resp 22    Ht 5' 2\" (1.575 m)    Wt 42.8 kg (94 lb 4.8 oz)    SpO2 90%    BMI 17.25 kg/m2      Tmax/24hrs: Temp (24hrs), Av.5 °F (36.4 °C), Min:97.3 °F (36.3 °C), Max:97.7 °F (36.5 °C)  IOBRIEF    Intake/Output Summary (Last 24 hours) at 18 1640  Last data filed at 18 2100   Gross per 24 hour   Intake              375 ml   Output                0 ml   Net              375 ml         General:  Alert, [place], mild agitation    Pulmonary:  Bilaterally clear, no Rales. Cardiovascular: Regular rate and Rhythm. GI:  Soft, Non distended, Non tender. + Bowel sounds. Extremities:  No edema, cyanosis, clubbing. Neurologic: AA, mild agitation. Moves all ext      Medications:   Current Facility-Administered Medications   Medication Dose Route Frequency    dextrose 5 % - 0.45% NaCl infusion  100 mL/hr IntraVENous CONTINUOUS    [START ON 2018] famotidine (PF) (PEPCID) 20 mg in sodium chloride 0.9% 10 mL injection  20 mg IntraVENous QHS    LORazepam (ATIVAN) injection 0.5 mg  0.5 mg IntraVENous Q6H PRN    acetaminophen (TYLENOL) tablet 500 mg  500 mg Oral Q4H PRN    heparin (porcine) injection 5,000 Units  5,000 Units SubCUTAneous Q8H    cefTRIAXone (ROCEPHIN) 2 g in sterile water (preservative free) 20 mL IV syringe  2 g IntraVENous Q24H    glucose chewable tablet 16 g  4 Tab Oral PRN    glucagon (GLUCAGEN) injection 1 mg  1 mg IntraMUSCular PRN    dextrose (D50W) injection syrg 12.5-25 g  25-50 mL IntraVENous PRN    metoprolol (LOPRESSOR) injection 2.5 mg  2.5 mg IntraVENous Q6H    Lactobacillus Acidoph & Bulgar (FLORANEX) tablet 2 Tab  2 Tab Oral BID       Labs:    Recent Results (from the past 24 hour(s))   C. DIFFICILE/EPI PCR    Collection Time: 18  5:00 PM   Result Value Ref Range    Special Requests: NO SPECIAL REQUESTS      Culture result:        Toxigenic C. difficile NEGATIVE                         C. difficile target DNA sequences are not detected.    GLUCOSE, POC    Collection Time: 18  5:27 PM Result Value Ref Range    Glucose (POC) 111 (H) 70 - 496 mg/dL   METABOLIC PANEL, BASIC    Collection Time: 06/05/18  7:40 PM   Result Value Ref Range    Sodium 148 (H) 136 - 145 mmol/L    Potassium 4.3 3.5 - 5.5 mmol/L    Chloride 116 (H) 100 - 108 mmol/L    CO2 14 (L) 21 - 32 mmol/L    Anion gap 18 3.0 - 18 mmol/L    Glucose 105 (H) 74 - 99 mg/dL    BUN 90 (H) 7.0 - 18 MG/DL    Creatinine 1.94 (H) 0.6 - 1.3 MG/DL    BUN/Creatinine ratio 46 (H) 12 - 20      GFR est AA 29 (L) >60 ml/min/1.73m2    GFR est non-AA 24 (L) >60 ml/min/1.73m2    Calcium 8.6 8.5 - 10.1 MG/DL   TROPONIN I    Collection Time: 06/05/18  7:40 PM   Result Value Ref Range    Troponin-I, Qt. 0.21 (H) 0.0 - 0.045 NG/ML   GLUCOSE, POC    Collection Time: 06/06/18 12:02 AM   Result Value Ref Range    Glucose (POC) 88 70 - 110 mg/dL   CBC WITH AUTOMATED DIFF    Collection Time: 06/06/18 12:55 AM   Result Value Ref Range    WBC 16.3 (H) 4.6 - 13.2 K/uL    RBC 4.18 (L) 4.20 - 5.30 M/uL    HGB 12.2 12.0 - 16.0 g/dL    HCT 37.1 35.0 - 45.0 %    MCV 88.8 74.0 - 97.0 FL    MCH 29.2 24.0 - 34.0 PG    MCHC 32.9 31.0 - 37.0 g/dL    RDW 15.9 (H) 11.6 - 14.5 %    PLATELET 429 186 - 694 K/uL    MPV 10.6 9.2 - 11.8 FL    NEUTROPHILS 65 42 - 75 %    BAND NEUTROPHILS 28 (H) 0 - 5 %    LYMPHOCYTES 2 (L) 20 - 51 %    MONOCYTES 4 2 - 9 %    EOSINOPHILS 0 0 - 5 %    BASOPHILS 0 0 - 3 %    METAMYELOCYTES 1 (H) 0 %    ABS. NEUTROPHILS 15.2 (H) 1.8 - 8.0 K/UL    ABS. LYMPHOCYTES 0.3 (L) 0.8 - 3.5 K/UL    ABS. MONOCYTES 0.7 0 - 1.0 K/UL    ABS. EOSINOPHILS 0.0 0.0 - 0.4 K/UL    ABS.  BASOPHILS 0.0 0.0 - 0.06 K/UL    DF MANUAL      PLATELET COMMENTS ADEQUATE PLATELETS      RBC COMMENTS ANISOCYTOSIS  1+       CK    Collection Time: 06/06/18 12:55 AM   Result Value Ref Range     (H) 26 - 945 U/L   METABOLIC PANEL, COMPREHENSIVE    Collection Time: 06/06/18 12:55 AM   Result Value Ref Range    Sodium 145 136 - 145 mmol/L    Potassium 4.2 3.5 - 5.5 mmol/L    Chloride 115 (H) 100 - 108 mmol/L    CO2 16 (L) 21 - 32 mmol/L    Anion gap 14 3.0 - 18 mmol/L    Glucose 112 (H) 74 - 99 mg/dL    BUN 93 (H) 7.0 - 18 MG/DL    Creatinine 2.02 (H) 0.6 - 1.3 MG/DL    BUN/Creatinine ratio 46 (H) 12 - 20      GFR est AA 28 (L) >60 ml/min/1.73m2    GFR est non-AA 23 (L) >60 ml/min/1.73m2    Calcium 8.8 8.5 - 10.1 MG/DL    Bilirubin, total 0.5 0.2 - 1.0 MG/DL    ALT (SGPT) 54 13 - 56 U/L    AST (SGOT) 84 (H) 15 - 37 U/L    Alk.  phosphatase 77 45 - 117 U/L    Protein, total 6.5 6.4 - 8.2 g/dL    Albumin 2.5 (L) 3.4 - 5.0 g/dL    Globulin 4.0 2.0 - 4.0 g/dL    A-G Ratio 0.6 (L) 0.8 - 1.7     GLUCOSE, POC    Collection Time: 06/06/18  6:22 AM   Result Value Ref Range    Glucose (POC) 77 70 - 110 mg/dL       Signed By: Channing Stearns MD     June 6, 2018

## 2018-06-06 NOTE — PROGRESS NOTES
TRANSFER - OUT REPORT:    Verbal report given to Viviana Cm RN(name) on Kristofer Loza  being transferred to (unit) for routine progression of care       Report consisted of patients Situation, Background, Assessment and   Recommendations(SBAR). Information from the following report(s) SBAR, Kardex, Intake/Output, MAR and Recent Results was reviewed with the receiving nurse. Lines:   Peripheral IV 06/04/18 Left Forearm (Active)   Site Assessment Clean, dry, & intact 6/5/2018  8:00 AM   Phlebitis Assessment 0 6/5/2018  8:00 AM   Infiltration Assessment 0 6/5/2018  8:00 AM   Dressing Status Clean, dry, & intact 6/5/2018  8:00 AM   Dressing Type Transparent;Tape 6/5/2018  8:00 AM   Hub Color/Line Status Blue; Infusing 6/5/2018  8:00 AM   Action Taken Open ports on tubing capped 6/5/2018  8:00 AM   Alcohol Cap Used Yes 6/5/2018  8:00 AM       Peripheral IV 06/04/18 Right Wrist (Active)   Site Assessment Clean, dry, & intact 6/5/2018  8:00 AM   Phlebitis Assessment 0 6/5/2018  8:00 AM   Infiltration Assessment 0 6/5/2018  8:00 AM   Dressing Status Clean, dry, & intact 6/5/2018  8:00 AM   Dressing Type Transparent;Tape 6/5/2018  8:00 AM   Hub Color/Line Status Blue;Flushed;Patent 6/5/2018  8:00 AM   Action Taken Open ports on tubing capped 6/5/2018  8:00 AM   Alcohol Cap Used Yes 6/5/2018  8:00 AM        Opportunity for questions and clarification was provided.       Patient transported with:   O2 @ 3 liters

## 2018-06-06 NOTE — PROGRESS NOTES
NUTRITION    Nutrition Screen      RECOMMENDATIONS / PLAN:     - Provide nutrition interventions consistent with plan of care goals. Recommend starting diet for comfort as medically appropriate  - Continue IV fluid hydration   - Continue RD inpatient monitoring and evaluation. NUTRITION INTERVENTIONS & DIAGNOSIS:     [x] IV fluids: continue  [x] Collaboration and referral of nutrition care: pt discussed with Dr Merry Lay; discussed starting diet for comfort; MD awaiting son decision regarding plan of care goals    Nutrition Diagnosis:  Inadequate oral intake related to inability to tolerate po as evidenced by NPO per SLP    ASSESSMENT:     Pt confused; unable to answer questions appropriately. Unable to get in contact with her son. Per MD note, patient's son declined feeding tube/ PEG for patient. Discussed with MD about possibly starting diet for comfort; MD awaiting son decision on plan of care goals prior to ordering a diet for comfort. Average po intake adequate to meet patients estimated nutritional needs:   [] Yes     [x] No   [] Unable to determine at this time    Diet: DIET NPO      Food Allergies:  None known   Current Appetite:   [] Good     [] Fair     [] Poor     [x] Other: NPO  Appetite/meal intake prior to admission:   [] Good     [] Fair     [] Poor     [x] Other: unknown  Feeding Limitations:  [x] Swallowing difficulty: SLP following; recommend NPO    [] Chewing difficulty    [] Other:  Current Meal Intake: No data found.       BM:  6/6  Skin Integrity: unstageable decubitus to left buttocks  Edema: loose  Pertinent Medications: Reviewed: D5 1/2NS at 75 mL/hr (90 gm dextrose, 306 kcal), lactobacillus    Recent Labs      06/06/18   0055  06/05/18   1940  06/05/18   0703  06/05/18   0005  06/04/18   1245   NA  145  148*  147*  147*  142   K  4.2  4.3  4.5  4.2  4.8   CL  115*  116*  113*  114*  108   CO2  16*  14*  18*  18*  12*   GLU  112*  105*  138*  142*  102*   BUN  93*  90*  87*  83*  82* CREA  2.02*  1.94*  1.81*  1.90*  2.47*   CA  8.8  8.6  9.0  9.2  11.2*   MG   --    --    --   2.1   --    PHOS   --    --    --   5.2*   --    ALB  2.5*   --    --    --   3.5   SGOT  84*   --    --    --   30   ALT  54   --    --    --   16       Intake/Output Summary (Last 24 hours) at 06/06/18 1216  Last data filed at 06/05/18 2100   Gross per 24 hour   Intake              680 ml   Output                0 ml   Net              680 ml       Anthropometrics:  Ht Readings from Last 1 Encounters:   06/06/18 5' 2\" (1.575 m)     Last 3 Recorded Weights in this Encounter    06/04/18 1257 06/06/18 0428   Weight: 51.3 kg (113 lb) 42.8 kg (94 lb 4.8 oz)     Body mass index is 17.25 kg/(m^2). Weight History:   Weight Metrics 6/6/2018   Weight 94 lb 4.8 oz   BMI 17.25 kg/m2        Admitting Diagnosis: Atrial fibrillation with RVR (HCC)  Atrial fibrillation with RVR (HCC)  Acute metabolic encephalopathy  Pertinent PMHx:  No significant past medical hx    Education Needs:        [x] None identified  [] Identified - Not appropriate at this time  []  Identified and addressed - refer to education log  Learning Limitations:   [] None identified  [x] Identified: confusion  Cultural, Sikhism & ethnic food preferences:  [x] None identified    [] Identified and addressed     ESTIMATED NUTRITION NEEDS:     Calories: 3105-6435 kcal (30-35 kcal/kg) based on  [x] Actual BW: 43 kg      [] IBW   Protein: 34-52 gm (0.8-1.2 gm/kg) based on  [x] Actual BW      [] IBW   Fluid: 1 mL/kcal     MONITORING & EVALUATION:     Nutrition Goal(s):   1. Po intake of meals will meet >75% of patient estimated nutritional needs within the next 7 days. Outcome:  [] Met/Ongoing    []  Not Met    [x] New/Initial Goal   2. Provide nutrition intervention as appropriate with goals of care for the next 5 days.  Outcome:  [] Met/Ongoing    []  Not Met    [x] New/Initial Goal       Monitoring:   [x] Food and beverage intake   [x] Diet order   [x] Nutrition-focused physical findings   [x] Treatment/therapy   [] Weight   [] Enteral nutrition intake        Previous Recommendations (for follow-up assessments only):     []   Implemented       []   Not Implemented (RD to address)      [] No Longer Appropriate     [] No Recommendation Made     Discharge Planning:  Diet for comfort as tolerates pending plan of care  [x] Participated in care planning, discharge planning, & interdisciplinary rounds as appropriate      Blaine Laura, [address] [place] [address]   Pager: [phone]

## 2018-06-06 NOTE — PROGRESS NOTES
This pt who is listed as non-insured, does have Medicare and Medicaid and is involved with the [address] [place] [address] program, this writer contacted [facility] to fax a demographic sheet on this pt that can be sent to the CM office to scan in the computer for an appropriate billing source for this pt. This writer will contact family regarding planning.

## 2018-06-06 NOTE — PROGRESS NOTES
Problem: Dysphagia (Adult)  Goal: *Acute Goals and Plan of Care (Insert Text)  Patient will:  1. Tolerate PO trials with 0 s/s overt distress in 4/5 trials  2. Utilize compensatory swallow strategies/maneuvers (decrease bite/sip, size/rate, alt. liq/sol) with min cues in 4/5 trials  3. Perform oral-motor/laryngeal exercises to increase oropharyngeal swallow function with min cues  4. Complete an objective swallow study (i.e., MBSS) to assess swallow integrity, r/o aspiration, and determine of safest LRD, min A    Recommend:   *Given advanced age and mental status, recommend consider initiation of comfort feeds. Do not recommend MBS given extremely poor participation/agitation*  NPO with alternate means of nutrition/hydration vs comfort feeds  Strict aspiration precautions (HOB >30 degrees at all times, Oral care TID)     Outcome: Not Progressing Towards Goal  Speech language pathology dysphagia treatment    Patient: Lisa Fung (80 y.o. female)  Date: 6/6/2018  Diagnosis: Atrial fibrillation with RVR ([facility])  Atrial fibrillation with RVR ([address]  Acute metabolic encephalopathy   Precautions: Aspiration      ASSESSMENT:  Pt seen for follow up dysphagia tx, confused with extremely limited participation. Pt perseverating on \"the teacher told me one lesson\" throughout treatment, unable to re-direct despite max re-direction/encouragement. x1 tsp presentation of pudding placed in pt's mouth with pt immediately ejecting bolus stating \"Get out. I'm done\". Pt refusing all other po trials despite max multi-modal stim/encouragement. Per chart review, family does not want alternative means of nutrition/hydration at this time. Given advanced age and mental status, recommend consider initiation of comfort feeds. Do not recommend MBS given extremely poor participation/agitation. D/w RN. Will follow x1-2 visits as indicated.        Progression toward goals:  []         Improving appropriately and progressing toward goals  []         Improving slowly and progressing toward goals  [x]         Not making progress toward goals and plan of care will be adjusted     PLAN:  Recommendations and Planned Interventions:  Patient continues to benefit from skilled intervention to address the above impairments. Continue treatment per established plan of care. Discharge Recommendations: To Be Determined     SUBJECTIVE:   Patient stated Get out!     OBJECTIVE:   Cognitive and Communication Status:  Neurologic State:  Alert, confused, agitated   Orientation Level: Oriented to person, Disoriented to place, Disoriented to situation, Disoriented to time  Cognition: Poor safety awareness, Impaired decision making, Decreased attention/concentration, Decreased command following  Dysphagia Treatment:  Oral Assessment:  Oral Assessment  Labial: No impairment  Dentition: Intact  Oral Hygiene: adequate  Lingual: No impairment  Velum: No impairment  Mandible: No impairment  P.O.  Trials:   Patient Position: [facility]   Vocal quality prior to P.O.: No impairment   Consistency Presented: Pudding   How Presented: Self-fed/presented, Cup/sip, Spoon   Bolus Acceptance: Impaired   Bolus Formation/Control: Impaired   Type of Impairment:  (Immediate ejection of bolus )   Propulsion: Absent   Oral Residue: None   Initiation of Swallow: No impairment   Laryngeal Elevation: Weak, Decreased   Effective Modifications: None   Cues for Modification:  Max    Oral Phase Severity:  Severe    Pharyngeal Phase Severity : Severe     PAIN:  Pt confused; unable to verbalize     After treatment:   []              Patient left in no apparent distress sitting up in chair  [x]              Patient left in no apparent distress in bed  [x]              Call bell left within reach  [x]              Nursing notified  []              Caregiver present  []              Bed alarm activated      COMMUNICATION/EDUCATION:   [x]              SLP educated pt with regard to diet recs, oral care, positioning.     Marcella Lloyd M.S., [address] [facility]  Speech-Language Pathologist

## 2018-06-06 NOTE — ROUTINE PROCESS
Bedside and Verbal shift change report given to Eh De La Rosa (oncoming nurse) by Sabrina Sacks   (offgoing nurse). Report included the following information SBAR, Intake/Output, MAR and Cardiac Rhythm A Fib.

## 2018-06-06 NOTE — ROUTINE PROCESS
Patient has been calm most of shift, but has periods of confusion, crying, refusing care. Mouth is very dry, crusty. Refuses to allow oral care, even offered an ice chip. Patient has begged this nurse to turn off all the lights and be allowed to sleep. Therefore, all lights turned off, siderails X3 up for pt safety, and pt permitted to sleep. She had become combative with speech therapist.   Skin dry, bruises on arms. cardizem drip infusing, titrated down to 5 mg hr. SBP around 's. -130. Showing at fib on monitor. pts  from [place] in contact with staff, requesting to speak with hospital   Assigned to Ms Garcia's case. Info forwarded to case management. Son called for update, stated he would be in to visit shortly. Son in and at the bedside. He was able to get pt to do a few things but still would not allow oral care or skin care. Had large brown stool,cleaned and ended up having one more which was liquid. Per Dr Merlinda Crocker order, spec sent for C-Diff. Contact isolation initiated. cardizem was weaned of after metoprolol IV started. IV switched to . 45 NS at 75 ml hr.  Pt seems more comfortable at end of shift.

## 2018-06-07 NOTE — ROUTINE PROCESS
Bedside and Verbal shift change report given to [facility] nurse) by Jaleel Veloz (offgoing nurse). Report included the following information SBAR, Kardex, MAR and Recent Results. SITUATION:    Code Status: DNR   Reason for Admission: Atrial fibrillation with RVR ([address]   Atrial fibrillation with RVR ([address]   Acute metabolic encephalopathy    [facility] day: 3   Problem List:       Hospital Problems  Never Reviewed          Codes Class Noted POA    Atrial fibrillation with RVR ([address] ICD-10-CM: I48.91  ICD-9-CM: 427.31  6/4/2018 Unknown        Acute metabolic encephalopathy AVZ-39-BR: G93.41  ICD-9-CM: 348.31  6/4/2018 Unknown              BACKGROUND:    Past Medical History: History reviewed. No pertinent past medical history.       Patient taking anticoagulants no     ASSESSMENT:    Changes in Assessment Throughout Shift: stable     Patient has Central Line: no Reasons if yes:    Patient has Felix Cath: no Reasons if yes:       Last Vitals:     Vitals:    06/07/18 0508 06/07/18 0735 06/07/18 1155 06/07/18 1544   BP: 113/75 108/66 126/72 100/55   Pulse: 90 78 80 79   Resp: 18 18 18 18   Temp: 97.2 °F (36.2 °C) 95.9 °F (35.5 °C) 97 °F (36.1 °C) 97.1 °F (36.2 °C)   SpO2: 97% 93% 93% 98%   Weight:       Height:            IV and DRAINS (will only show if present)   [REMOVED] Peripheral IV 06/04/18 Left Forearm-Site Assessment: Clean, dry, & intact  [REMOVED] Peripheral IV 06/04/18 Right Wrist-Site Assessment: Clean, dry, & intact  Peripheral IV 06/06/18 Right;Upper Arm-Site Assessment: Clean, dry, & intact     WOUND (if present)   Wound Type:  none   Dressing present Dressing Present : Yes   Wound Concerns/Notes:  none     PAIN    Pain Assessment    Pain Intensity 1: 0 (06/07/18 1600)              Patient Stated Pain Goal: 0  o Interventions for Pain:  none  o Intervention effective:   o Time of last intervention:    o Reassessment Completed: yes      Last 3 Weights:  Last 3 Recorded Weights in this Encounter    06/04/18 1257 06/06/18 0428   Weight: 51.3 kg (113 lb) 42.8 kg (94 lb 4.8 oz)     Weight change:      INTAKE/OUPUT    Current Shift:      Last three shifts:       LAB RESULTS     Recent Labs      06/06/18   0055  06/05/18   0005   WBC  16.3*  16.9*   HGB  12.2  12.5   HCT  37.1  37.0   PLT  212  226        Recent Labs      06/06/18   0055  06/05/18   1940  06/05/18   0703  06/05/18   0005   NA  145  148*  147*  147*   K  4.2  4.3  4.5  4.2   GLU  112*  105*  138*  142*   BUN  93*  90*  87*  83*   CREA  2.02*  1.94*  1.81*  1.90*   CA  8.8  8.6  9.0  9.2   MG   --    --    --   2.1   INR   --    --    --   1.3*       RECOMMENDATIONS AND DISCHARGE PLANNING     1. Pending tests/procedures/ Plan of Care or Other Needs: Comfort Care    2. Discharge plan for patient and Needs/Barriers: None    3. Estimated Discharge Date: comfort care Posted on Whiteboard in Patients Room: yes      4. The patient's care plan was reviewed with the oncoming nurse. \"HEALS\" SAFETY CHECK      Fall Risk    Total Score: 3    Safety Measures: Safety Measures: Bed/Chair alarm on, Bed/Chair-Wheels locked, Bed in low position, Call light within reach, Side rails X 3, Gripper socks    A safety check occurred in the patient's room between off going nurse and oncoming nurse listed above.     The safety check included the below items  Area Items   H  High Alert Medications - Verify all high alert medication drips (heparin, PCA, etc.)   E  Equipment - Suction is set up for ALL patients (with yanker)  - Red plugs utilized for all equipment (IV pumps, etc.)  - WOWs wiped down at end of shift.  - Room stocked with oxygen, suction, and other unit-specific supplies   A  Alarms - Bed alarm is set for fall risk patients  - Ensure chair alarm is in place and activated if patient is up in a chair   L  Lines - Check IV for any infiltration  - Felix bag is empty if patient has a Felix   - Tubing and IV bags are labeled   S  Safety - Room is clean, patient is clean, and equipment is clean. - Hallways are clear from equipment besides carts. - Fall bracelet on for fall risk patients  - Ensure room is clear and free of clutter  - Suction is set up for ALL patients (with saad)  - Hallways are clear from equipment besides carts.    - Isolation precautions followed, supplies available outside room, sign posted     Enedina Oneill

## 2018-06-07 NOTE — ROUTINE PROCESS
Bedside and Verbal shift change report given to Luana Sen (oncoming nurse) by Miki Castillo   (offgoing nurse). Report included the following information SBAR, Intake/Output, MAR and Cardiac Rhythm Afib.

## 2018-06-07 NOTE — PROGRESS NOTES
SLP Note:    Attempted follow up dysphagia tx this am; however, pt asleep and not easily aroused for breakfast meal.  Pt now started on comfort diet. Will follow up as appropriate. D/w RN.        Rosalba Mcconnell M.S., [address] [facility]  Speech-Language Pathologist

## 2018-06-07 NOTE — PROGRESS NOTES
06/06/18 2237   Vitals   Temp 97.3 °F (36.3 °C)   Temp Source Axillary   Pulse (Heart Rate) (!) 122   Heart Rate Source Monitor   Resp Rate 17   O2 Sat (%) 98 %   Level of Consciousness Alert   /57   BP 1 Location Right arm   BP 1 Method Automatic   BP Patient Position At rest   Cardiac Rhythm A Fib   MEWS Score 3   Medicated with metoprolol 2.5 mg IV

## 2018-06-07 NOTE — WOUND CARE
Physical Exam   Musculoskeletal:        Legs:  Seen by wound care per consult, lower extremity assessment performed at this time. Skin assessment listed above noted during skin assessment. Treatment plan explained to [facility] and voiced agreement to continue  treatment. Clear zinc to be applied to skin twice daily. No wound care education provided to pt at this time. No wound care intervention required at this time. Plan of care reviewed with nursing. Will turn over care to nursing staff at this time  Sushma Ron, Wound Care Department

## 2018-06-07 NOTE — PROGRESS NOTES
[facility] Hospitalist Group  Progress Note    Patient: Leatha Lr Age: 80 y.o. : 1921 MR#: [id number] SSN: [id number]  Date/Time: 2018     Subjective: pt AA, remains confused and mild agitated. Tried feeding but did not eat. Assessment/Plan:   1. Acute metabolic encephalopathy: due to # 2, will monitor   2. UTI E coli: change to PO Abx. 3. VALENTE:   4. Metabolic acidosis, acute, anion gap due to # 2/3:  5. Diarrhea: neg C diff toxin   6. Paroxysmal atrial fibrillation with RVR: rate better, change IV BB to PO   7. Elevated trop: demand ischemia vs related to # 3 and # 5, no CP, son doesn't want aggressive measures but ok for cardio eval and med Rx. Cardio in put noted  8. Severe Sepsis d/t # 2 - VALENTE and encephalopathy with clear infectious source, cont Abx. 9. Dysphagia: s/p re-eval with SLP failed again and not safe for MBS. Son doesn't want Tf, chris comfort feeds   10. Lactic acidosis: improved   11. Advanced age  15. DNR  D/w pt's son Amandeep Payne [phone] over phone in detail including aggressive care vs comfort/hospise care. Son very clear no aggressive measures, No PEG or TF, he wants comfort feeds and keep pt comfortable. He wants hospice care.   Start comfort care, and hospice at facility when they can accept her   D/w CM    Case discussed with:  [x]Patient  [x]Family  [x]Nursing  []Case Management  DVT Prophylaxis:  []Lovenox  [x]Hep SQ  []SCDs  []Coumadin   []On Heparin gtt    Objective:   VS:   Visit Vitals    /72 (BP 1 Location: Right arm, BP Patient Position: At rest)    Pulse 80    Temp 97 °F (36.1 °C)    Resp 18    Ht 5' 2\" (1.575 m)    Wt 42.8 kg (94 lb 4.8 oz)    SpO2 93%    BMI 17.25 kg/m2      Tmax/24hrs: Temp (24hrs), Av.1 °F (36.2 °C), Min:95.9 °F (35.5 °C), Max:97.7 °F (36.5 °C)  IOBRIEF  No intake or output data in the 24 hours ending 18 1229      General:  Alert, [place], mild agitation    Pulmonary:  Bilaterally clear, no Rales. Cardiovascular: Regular rate and Rhythm. GI:  Soft, Non distended, Non tender. + Bowel sounds. Extremities:  No edema, cyanosis, clubbing. Neurologic: AA, mild agitation.  Moves all ext      Medications:   Current Facility-Administered Medications   Medication Dose Route Frequency    metoprolol tartrate (LOPRESSOR) tablet 12.5 mg  12.5 mg Oral Q12H    famotidine (PEPCID) tablet 20 mg  20 mg Oral DAILY    [START ON 6/8/2018] levoFLOXacin (LEVAQUIN) tablet 250 mg  250 mg Oral Q24H    LORazepam (INTENSOL) 2 mg/mL oral concentrate 0.5 mg  0.5 mg SubLINGual Q4H PRN    morphine (ROXANOL) 100 mg/5 mL (20 mg/mL) concentrated solution 2 mg  2 mg SubLINGual Q3H PRN    acetaminophen (TYLENOL) tablet 500 mg  500 mg Oral Q4H PRN    glucose chewable tablet 16 g  4 Tab Oral PRN    glucagon (GLUCAGEN) injection 1 mg  1 mg IntraMUSCular PRN    dextrose (D50W) injection syrg 12.5-25 g  25-50 mL IntraVENous PRN    Lactobacillus Acidoph & Bulgar (FLORANEX) tablet 2 Tab  2 Tab Oral BID       Labs:    Recent Results (from the past 24 hour(s))   GLUCOSE, POC    Collection Time: 06/07/18 11:43 AM   Result Value Ref Range    Glucose (POC) 82 70 - 110 mg/dL       Signed By: Jeanna Ortega MD     June 7, 2018

## 2018-06-07 NOTE — PROGRESS NOTES
NUTRITION    Nursing Referral: Pressure Injury     RECOMMENDATIONS / PLAN:     - Continue to provide nutrition interventions consistent with plan of care goals. - Update food preferences. - Add supplements: Ensure Enlive, BID.  - Continue IV fluid hydration.  - Continue RD inpatient monitoring and evaluation. NUTRITION INTERVENTIONS & DIAGNOSIS:     [x] IV fluids: continue  [x] Medical food supplement therapy: Initiate  [x] Collaboration and referral of nutrition care: Discussed meal intake with RN and CNA    Nutrition Diagnosis:  Inadequate oral intake related to poor appetite and mental status as evidenced by pt with poor meal intake since admission. Increased nutrient needs (protein) related to increased demand for wound healing as evidenced by pt with stage II pressure injury to buttocks. Patient meets criteria for Severe Protein Calorie Malnutrition as evidenced by:   ASPEN Malnutrition Criteria  Acute Illness, Chronic Illness, or Social/Enviornmental: Chronic illness  Body Fat: Severe (Upper arm region)  Muscle Mass: Severe (Temple, clavicle, & scapular bone regions)  ASPEN Malnutrition Score - Chronic Illness: 12  Chronic Illness - Malnutrition Diagnosis: Severe malnutrition. ASSESSMENT:     6/7: Diet started for comfort per pt's son decision for no PEG or tube feeding. Per RN and CNA 0% meal/fluid intake today. Remains on dextrose fluids. Nutrition focused physical exam completed. Limited due to decreased mobility and mental status. Noted skin breakdown. Son considering comfort care or hospice care. ADDENDUM: Discussed food preferences with RN  6/6: Pt confused; unable to answer questions appropriately. Unable to get in contact with her son. Per MD note, patient's son declined feeding tube/ PEG for patient. Discussed with MD about possibly starting diet for comfort; MD awaiting son decision on plan of care goals prior to ordering a diet for comfort.     Average po intake adequate to meet patients estimated nutritional needs:   [] Yes     [x] No   [] Unable to determine at this time    Diet: DIET CARDIAC Pureed      Food Allergies:  NKFA   Current Appetite:   [] Good     [] Fair     [x] Poor     [] Other  Appetite/meal intake prior to admission:   [] Good     [] Fair     [] Poor     [x] Other: unknown  Feeding Limitations:  [x] Swallowing difficulty: SLP following; recommending comfort feeds    [] Chewing difficulty    [] Other:  Current Meal Intake: No data found. BM:  6/7  Skin Integrity: stage II pressure injury to left buttocks, abrasion to left calf  Edema: None  Pertinent Medications: Reviewed: D5 1/2NS at 100 mL/hr (120 gm dextrose, 408 kcal), floranex    Recent Labs      06/06/18   0055  06/05/18   1940  06/05/18   0703  06/05/18   0005  06/04/18   1245   NA  145  148*  147*  147*  142   K  4.2  4.3  4.5  4.2  4.8   CL  115*  116*  113*  114*  108   CO2  16*  14*  18*  18*  12*   GLU  112*  105*  138*  142*  102*   BUN  93*  90*  87*  83*  82*   CREA  2.02*  1.94*  1.81*  1.90*  2.47*   CA  8.8  8.6  9.0  9.2  11.2*   MG   --    --    --   2.1   --    PHOS   --    --    --   5.2*   --    ALB  2.5*   --    --    --   3.5   SGOT  84*   --    --    --   30   ALT  54   --    --    --   16     No intake or output data in the 24 hours ending 06/07/18 1015    Anthropometrics:  Ht Readings from Last 1 Encounters:   06/06/18 5' 2\" (1.575 m)     Last 3 Recorded Weights in this Encounter    06/04/18 1257 06/06/18 0428   Weight: 51.3 kg (113 lb) 42.8 kg (94 lb 4.8 oz)     Body mass index is 17.25 kg/(m^2).    Underweight       Weight History:   Weight Metrics 6/6/2018   Weight 94 lb 4.8 oz   BMI 17.25 kg/m2        Admitting Diagnosis: Atrial fibrillation with RVR (HCC)  Atrial fibrillation with RVR (HCC)  Acute metabolic encephalopathy  Pertinent PMHx:  No significant past medical hx    Education Needs:        [x] None identified  [] Identified - Not appropriate at this time  []  Identified and addressed - refer to education log  Learning Limitations:   [] None identified  [x] Identified: confusion  Cultural, Faith & ethnic food preferences:  [x] None identified    [] Identified and addressed     ESTIMATED NUTRITION NEEDS:     Calories: 4360-1558 kcal (30-35 kcal/kg) based on  [x] Actual BW: 43 kg      [] IBW   Protein: 52-65 gm (1.2-1.5 gm/kg) based on  [x] Actual BW      [] IBW   Fluid: 1 mL/kcal     MONITORING & EVALUATION:     Nutrition Goal(s):   1. Po intake of meals will meet >75% of patient estimated nutritional needs within the next 7 days. Outcome:  [] Met/Ongoing    [x]  Not Met    [] New/Initial Goal   2. Provide nutrition intervention as appropriate with goals of care for the next 5 days.  Outcome:  [x] Met/Ongoing    []  Not Met    [] New/Initial Goal       Monitoring:   [x] Food and beverage intake   [x] Diet order   [x] Nutrition-focused physical findings   [x] Treatment/therapy   [] Weight   [] Enteral nutrition intake        Previous Recommendations (for follow-up assessments only):     [x]   Implemented       []   Not Implemented (RD to address)      [] No Longer Appropriate     [] No Recommendation Made     Discharge Planning:  Diet for comfort as pt tolerates  [x] Participated in care planning, discharge planning, & interdisciplinary rounds as appropriate      Nick Olguin RD   Pager: [phone]

## 2018-06-07 NOTE — PROGRESS NOTES
This writer spoke with Niraj Arias R.N. From the [facility] program who states that this pt will return to [facility]. Niraj Arias was informed that this pt's family is requesting hospice, she has agreed to contact this pt's son regarding how [facility] and hospice coordinate care for their patients. This information was provided to Dr. Le Gayle, Hospitalist. This Davina Peel is now awaiting a decision from family after their conversation with [facility] representative.

## 2018-06-08 NOTE — ROUTINE PROCESS
Bedside and Verbal shift change report given to Lynn Monroe (oncoming nurse) by Arnol Bernard   (offgoing nurse). Report included the following information SBAR, Intake/Output and MAR.

## 2018-06-08 NOTE — ROUTINE PROCESS
Patient is discharged for transfer to [address] [place] [address] via med transport pt. Is on comfort care report given to nurse Audra Hernandez

## 2018-06-08 NOTE — ROUTINE PROCESS
Patient is breathing spontaneously unlabored on 3 lit 02 /n.c. Eyes closed son in the room. I asked son if he wants me to check the blood sugar he refused. Pt. Is on comfort care, will monitor.

## 2018-06-08 NOTE — DISCHARGE INSTRUCTIONS
Discharge Instructions    Patient: Lindsey Yu MRN: [id number]  [facility]: [id number]    YOB: 1921  Age: 80 y.o.   Sex: female    DOA: 6/4/2018 LOS:  LOS: 4 days   Discharge Date:      DIET:  Comfort feeding    ACTIVITY: Activity as tolerated  · Home health care for hospice care     ADDITIONAL INFORMATION: If you experience any of the following symptoms but not limited to Fever, chills, nausea, vomiting, diarrhea, change in mentation, falling, bleeding, shortness of breath, chest pain, please call your hospice care team:     FOLLOW UP CARE:  PCP at [facility] as needed       Poly Claudio MD  6/8/2018 9:50 AM

## 2018-06-08 NOTE — DISCHARGE SUMMARY
[address] [facility] [address] Jaciel Olivia  MR#: [id number]  : 1921  ACCOUNT #: [de-identified]   ADMIT DATE: 2018  DISCHARGE DATE:     PRIMARY CARE PHYSICIAN:  Linda nickerson.    DISPOSITION:  Discharged to SNF with hospice/comfort care. DISCHARGE CONDITION:  Fair. DISCHARGE DIAGNOSES:  1. Acute metabolic encephalopathy, worsened from her baseline, which is alert, awake, oriented x2.    2.  UTI with E. coli. 3.  Acute renal failure. 4.  Acute metabolic acidosis. 5.  Diarrhea. 6.  Paroxysmal atrial fibrillation with RVR, improved now. 7.  Mild elevated troponin, possibly from demand ischemia. 8.  Severe sepsis present on admission. 9.  Dysphagia, on comfort feeds. 10.  Lactic acidosis, improved now. 6.  Advanced age. 12.  Advanced dementia. 13. DNR. 14.  Severe protein calorie malnutrition. DISCHARGE MEDICATIONS:  Most of her p.o. medications have been stopped as the patient is not able to tolerate p.o., but following medicines will be used as the patient tolerates, aspirin 81 mg daily, Levaquin 250 mg daily for 4 more days, polyvinyl alcohol ophthalmic drops as needed. Morphine and ativan for comfort. CONSULTATIONS IN HOSPITAL:  Consultation with Dr. Al Chaves, cardiology; consultation with Dr. Jasmyn Suarez, ICU critical care. MAJOR INVESTIGATIONS DURING THE HOSPITAL STAY:  The patient had a CT head which was negative for any acute changes. HOSPITAL COURSE:  This is a 72-year-old  female who lives in assisted living with the [facility] program.  She was actually visiting [facility] in [place] where she was noted to be more confused from her baseline, so patient was sent to the ER. The patient in the Emergency Room was noted to have acute renal failure, acute encephalopathy along with AFib with RVR, so patient was admitted to the step-down unit and cardiology was consulted.   Patient initially was started on Cardizem drip which was slowly titrated off and was switched to IV metoprolol and then to the p.o. metoprolol. Cardiology saw the patient and recommended because of advanced age, not a good candidate for anticoagulation, but recommended beta blockers for rate control. Cardiology also thought that patient has a poor long-term prognosis, recommended palliative care. In view of her UTI, the patient was started on antibiotic and urine cultures were obtained. Urine cultures grew E. coli. The patient's antibiotics were changed accordingly. Patient failed the swallow while in the hospital and she was not safe for modified barium either. Patient because of her advanced dementia, advanced age and very fragile condition in spite of aggressive antibiotics, patient continued to deteriorate. Discussed with the son who is a power of  about further plan care, he wanted comfort measures only. Patient was started on comfort measures in the hospital and hospice was consulted. Discussed with the son about further plan. The son wants her to go back to the assisted living with hospice.  is currently working with [facility] to place her back in the assisted living with hospice. The patient is currently on comfort feeds per son and will be transitioned to the assisted living with hospice once a bed available. Patient currently slightly sleepier, but on and off and wakes up and speaks clear sentences, but remains confused. Discussed with the son very detailed about plan of care, he is in agreement with the disposition to assisted living with hospice. Patient does have a POLST signed from before from Sandeep Cho and she was very clear that she wanted DNR and no artificial way of prolonging her life. DISPOSITION:  Patient will be transferred to the assisted living with hospice once bed available.     TRANSFER INSTRUCTIONS AND FOLLOWUP APPOINTMENTS:  Please refer to the previous discharge summary.       Enma Urbina MD BT/  D: 06/08/2018 12:03     T: 06/08/2018 12:31  JOB #: [id number]

## 2018-06-08 NOTE — PROGRESS NOTES
CMS was asked to arrange transportation to SNF ([address]), per [facility], DEON (care manager). CMS spoke with \"[facility]\" representative (Catrina Melendez), and scheduled a p/u time of 1330, via stretcher. \"[facility]\" packet was initiated and placed in the pt chart for completion. [facility], DEON (care manager) were made aware of the scheduled p/u.

## 2018-06-08 NOTE — PROGRESS NOTES
Problem: Dysphagia (Adult)  Goal: *Acute Goals and Plan of Care (Insert Text)  Patient will:-- D/C all goals, pt transitioned to comfort 6/8/18  1. Tolerate PO trials with 0 s/s overt distress in 4/5 trials  2. Utilize compensatory swallow strategies/maneuvers (decrease bite/sip, size/rate, alt. liq/sol) with min cues in 4/5 trials  3. Perform oral-motor/laryngeal exercises to increase oropharyngeal swallow function with min cues  4. Complete an objective swallow study (i.e., MBSS) to assess swallow integrity, r/o aspiration, and determine of safest LRD, min A    Recommend:   NPO with alternate means of nutrition/hydration vs comfort feeds  Strict aspiration precautions (HOB >30 degrees at all times, Oral care TID)     Outcome: Resolved/Met Date Met: 06/08/18  Speech language pathology dysphagia treatment & discharge    Patient: Ashly Amaya (80 y.o. female)  Date: 6/8/2018  Diagnosis: Atrial fibrillation with RVR (HCC)  Atrial fibrillation with RVR (HCC)  Acute metabolic encephalopathy <principal problem not specified>       Precautions: aspiration      ASSESSMENT:  Pt seen b/s for dysphagia tx. MD present at beginning of session. Pt sleeping, unable to arouse with sternal rub or elevation of HOB, no verbalizations. Pt with very shallow breathing, gurgly breath sounds, unable to clear despite spontaneous weak throat clearing. Pt not safe for po trails at this time. Per MD, pt transitioned to comfort measures. Rec: diet for quality of life with an understanding of possible risks and complications. SLP will sign off and be avail in the future if needed. Thank you. Progression toward goals:  []         Improving appropriately - goals met/approximated  [x]         Not making progress/Not appropriate - will d/c POC     PLAN:  Recommendations and Planned Interventions:  Maximum therapeutic gains met; safest, least restrictive diet achieved. D/C ST intervention at this time.    Discharge Recommendations: None     SUBJECTIVE:   Patient stated: n/a, no verbalizations    OBJECTIVE:   Cognitive and Communication Status:  Neurologic State: Eyes do not open to any stimulus, Sleeping  Orientation Level: Unable to verbalize  Cognition: No command following        Safety/Judgement: Fall prevention  Dysphagia Treatment:  Oral Assessment:  Oral Assessment  Labial: Decreased rate, Decreased seal  Dentition: Intact  Oral Hygiene: dry oral mucosa  Lingual: Decreased rate, Decreased strength  Velum: No impairment  Mandible: No impairment  P.O. Trials:   Patient Position: 60 at [facility]   Vocal quality prior to P.O.: Aphonic (moans/ groans)   Consistency Presented: Other (comment) (none)   How Presented: Self-fed/presented, Cup/sip, Spoon       Bolus Acceptance: Impaired   Bolus Formation/Control: Impaired   Type of Impairment:  (Immediate ejection of bolus )   Propulsion: Absent   Oral Residue: None   Initiation of Swallow: No impairment   Laryngeal Elevation: Weak, Decreased   Aspiration Signs/Symptoms: Change vocal quality, Increase in RR, Delayed cough/throat clear, Strong cough, Infiltrate on chest xray   Pharyngeal Phase Characteristics: Suspected pharyngeal residue, Poor endurance, Multiple swallows, Altered vocal quality, Easily fatigued    Effective Modifications: None   Cues for Modifications:  Moderate-maximal         Oral Phase Severity: No impairment   Pharyngeal Phase Severity : Severe     PAIN:  Start of Tx: pt unable to verbalize   End of Tx: pt unable to verbalize     After treatment:   []              Patient left in no apparent distress sitting up in chair  [x]              Patient left in no apparent distress in bed  [x]              Call bell left within reach  [x]              Nursing notified  []              Caregiver present  []              Bed alarm activated      COMMUNICATION/EDUCATION:   [x] Aspiration precautions; swallow safety; compensatory techniques  []        Patient unable to participate in education; education ongoing with staff  []  Posted safety precautions in patient's room.   [] Oral-motor/laryngeal strengthening exercises    Tyra Hicks MS, CCC/SLP  Time Calculation: 10 mins

## 2018-06-08 NOTE — CDMP QUERY
Please clarify if this patient is being treated/managed for:    =>   severe protein calorie malnutrition in setting of poor po intake   with supplements ordered. =>Other Explanation of clinical findings  =>Unable to Determine (no explanation of clinical findings)    The medical record reflects the following:    Risk:    Nutrition Diagnosis:  Inadequate oral intake related to poor appetite and mental status as evidenced by pt with poor meal intake since admission. Increased nutrient needs (protein) related to increased demand for wound healing as evidenced by pt with stage II pressure injury to buttocks    Clinical Indicators:   Patient meets criteria for Severe Protein Calorie Malnutrition as evidenced by:   ASPEN Malnutrition Criteria  Acute Illness, Chronic Illness, or Social/Environmental: Chronic illness  Body Fat: Severe (Upper arm region)  Muscle Mass: Severe (Temple, clavicle, & scapular bone regions)  ASPEN Malnutrition Score - Chronic Illness: 12  Chronic Illness - Malnutrition Diagnosis: Severe malnutrition. Treatment:    Add supplements: Ensure Enlive, BID.  - Continue IV fluid hydration.  - Continue RD inpatient monitoring and evaluation.        If you DECLINE this query or would like to communicate with [facility], please utilize the \"[facility] message box\" at the TOP of the Progress Note on the right.       Thank you,    Seferino Becerra RN   CCDS  x [phone]

## 2018-06-08 NOTE — PROGRESS NOTES
This writer spoke with Mario Zepeda, [address] who reports that they met with this pt's sons yesterday, and they requested that this pt not be returned to [place], [address],  when ready for discharge for hospice services. Kasia requested [address]. Pt was uploaded into e-discharge for admission today. Hospitalist Dr. Titi Fortune was made aware of this report from [facility]. Awaiting care transition instructions. This pt has been accepted by [facility]. [address] [place] [address] ,[address] [place] [address]. [phone]. This pt will be admitted as [address], pt's son is at pt's bedside, awaiting decision and transport. Hospitalist- Dr. Titi Fortune, made aware of this pt's acceptance. Transportation being set-up now. Transportation set-up for 1:30 p.m, facility and family notified of this discharge transport time.